# Patient Record
Sex: FEMALE | Race: WHITE | NOT HISPANIC OR LATINO | Employment: OTHER | URBAN - METROPOLITAN AREA
[De-identification: names, ages, dates, MRNs, and addresses within clinical notes are randomized per-mention and may not be internally consistent; named-entity substitution may affect disease eponyms.]

---

## 2017-07-03 ENCOUNTER — TRANSFERRED RECORDS (OUTPATIENT)
Dept: HEALTH INFORMATION MANAGEMENT | Facility: CLINIC | Age: 67
End: 2017-07-03

## 2017-07-24 ENCOUNTER — TRANSFERRED RECORDS (OUTPATIENT)
Dept: HEALTH INFORMATION MANAGEMENT | Facility: CLINIC | Age: 67
End: 2017-07-24

## 2017-07-25 ENCOUNTER — OFFICE VISIT (OUTPATIENT)
Dept: FAMILY MEDICINE | Facility: CLINIC | Age: 67
End: 2017-07-25
Payer: COMMERCIAL

## 2017-07-25 VITALS
HEART RATE: 62 BPM | OXYGEN SATURATION: 99 % | DIASTOLIC BLOOD PRESSURE: 81 MMHG | TEMPERATURE: 97 F | BODY MASS INDEX: 20.54 KG/M2 | RESPIRATION RATE: 16 BRPM | WEIGHT: 104.6 LBS | SYSTOLIC BLOOD PRESSURE: 126 MMHG | HEIGHT: 60 IN

## 2017-07-25 DIAGNOSIS — I45.10 RIGHT BUNDLE BRANCH BLOCK: ICD-10-CM

## 2017-07-25 DIAGNOSIS — Z12.31 VISIT FOR SCREENING MAMMOGRAM: ICD-10-CM

## 2017-07-25 DIAGNOSIS — L82.1 SEBORRHEIC KERATOSIS: ICD-10-CM

## 2017-07-25 DIAGNOSIS — Z00.00 ENCOUNTER FOR ROUTINE ADULT HEALTH EXAMINATION WITHOUT ABNORMAL FINDINGS: Primary | ICD-10-CM

## 2017-07-25 LAB
ALBUMIN SERPL-MCNC: 4 G/DL (ref 3.4–5)
ALP SERPL-CCNC: 83 U/L (ref 40–150)
ALT SERPL W P-5'-P-CCNC: 18 U/L (ref 0–50)
ANION GAP SERPL CALCULATED.3IONS-SCNC: 6 MMOL/L (ref 3–14)
AST SERPL W P-5'-P-CCNC: 97 U/L (ref 0–45)
BILIRUB SERPL-MCNC: 0.7 MG/DL (ref 0.2–1.3)
BUN SERPL-MCNC: 15 MG/DL (ref 7–30)
CALCIUM SERPL-MCNC: 9.4 MG/DL (ref 8.5–10.1)
CHLORIDE SERPL-SCNC: 105 MMOL/L (ref 94–109)
CO2 SERPL-SCNC: 29 MMOL/L (ref 20–32)
CREAT SERPL-MCNC: 0.82 MG/DL (ref 0.52–1.04)
GFR SERPL CREATININE-BSD FRML MDRD: 70 ML/MIN/1.7M2
GLUCOSE SERPL-MCNC: 76 MG/DL (ref 70–99)
POTASSIUM SERPL-SCNC: 4.1 MMOL/L (ref 3.4–5.3)
PROT SERPL-MCNC: 7.6 G/DL (ref 6.8–8.8)
SODIUM SERPL-SCNC: 140 MMOL/L (ref 133–144)

## 2017-07-25 PROCEDURE — 36415 COLL VENOUS BLD VENIPUNCTURE: CPT | Performed by: PHYSICIAN ASSISTANT

## 2017-07-25 PROCEDURE — 99396 PREV VISIT EST AGE 40-64: CPT | Performed by: PHYSICIAN ASSISTANT

## 2017-07-25 PROCEDURE — 80053 COMPREHEN METABOLIC PANEL: CPT | Performed by: PHYSICIAN ASSISTANT

## 2017-07-25 NOTE — PROGRESS NOTES
SUBJECTIVE:   CC: Rosa M Solano is an 66 year old woman who presents for preventive health visit.     Healthy Habits:    Do you get at least three servings of calcium containing foods daily (dairy, green leafy vegetables, etc.)? yes    Amount of exercise or daily activities, outside of work: 6 day(s) per week    Problems taking medications regularly No    Medication side effects: Yes tetracyclines    Have you had an eye exam in the past two years? yes    Do you see a dentist twice per year? yes    Do you have sleep apnea, excessive snoring or daytime drowsiness?no        Additional concerns: None    Today's PHQ-2 Score:   PHQ-2 ( 1999 Pfizer) 7/25/2017 7/20/2016   Q1: Little interest or pleasure in doing things 0 0   Q2: Feeling down, depressed or hopeless 0 0   PHQ-2 Score 0 0         Abuse: Current or Past(Physical, Sexual or Emotional)- No  Do you feel safe in your environment - Yes    Social History   Substance Use Topics     Smoking status: Never Smoker     Smokeless tobacco: Never Used     Alcohol use No     The patient does not drink >3 drinks per day nor >7 drinks per week.    Reviewed orders with patient.  Reviewed health maintenance and updated orders accordingly - Yes  Patient Active Problem List   Diagnosis     CARDIOVASCULAR SCREENING; LDL GOAL LESS THAN 160     Advanced directives, counseling/discussion     Seborrheic keratosis     Dysplastic nevi     Right bundle branch block     Past Surgical History:   Procedure Laterality Date     BIOPSY OF BREAST, INCISIONAL  2003    benign left breast mass     BUNIONECTOMY RT/LT  2007    RT     COLONOSCOPY  2003    NL, repeat in 10 years     COLONOSCOPY  9/13/2013    NL, repeat 10 years     CRYOCAUTERY OF CERVIX  1981     DILATION AND CURETTAGE, HYSTEROSCOPY DIAGNOSTIC, COMBINED N/A 10/16/2015    Procedure: COMBINED DILATION AND CURETTAGE, HYSTEROSCOPY DIAGNOSTIC;  Surgeon: Zahida Salazar MD;  Location: [facility] REPAIR OF MIRNAERTOE,ONE  2007    RT  foot second toe     PUNC/ASPIR BREAST CYST      benign right breast cyst       Social History   Substance Use Topics     Smoking status: Never Smoker     Smokeless tobacco: Never Used     Alcohol use No     Family History   Problem Relation Age of Onset     Alzheimer Disease Mother      Cancer - colorectal Father      at age 60     Circulatory Maternal Grandmother       of hepatitis at age 65     HEART DISEASE Maternal Grandfather      MI age 50         Current Outpatient Prescriptions   Medication Sig Dispense Refill     estradiol (ESTRACE VAGINAL) 0.1 MG/GM vaginal cream PLACE 2 G VAGINALLY TWICE A WEEK 127.5 g 3     CALCIUM 600 + D 600-400 MG-UNIT OR TABS take BID 0 0     MULTIVITAMIN TABS   OR one tab daily  0     Allergies   Allergen Reactions     Tetracyclines      Recent Labs   Lab Test  16   0836  16   1239  08/03/15   1622  07/17/15   0807  07/10/14   0758  13   0712   LDL  104*   --    --   92  108  92   HDL  68   --    --   73  68  68   TRIG  65   --    --   79  88  69   ALT   --    --    --   17  12  16   CR   --   0.71   --   0.64  0.75  0.71   GFRESTIMATED   --   83   --   >90  Non  GFR Calc    79  84   GFRESTBLACK   --   >90   GFR Calc     --   >90   GFR Calc    >90  >90   POTASSIUM   --   4.2   --   4.3  4.0  4.3   TSH   --    --   1.07   --   1.26   --             Patient over age 50, mutual decision to screen reflected in health maintenance.      Pertinent mammograms are reviewed under the imaging tab.  History of abnormal Pap smear: NO - age 30-65 PAP every 5 years with negative HPV co-testing recommended    Reviewed and updated as needed this visit by clinical staff  Tobacco  Allergies  Meds  Surg Hx  Fam Hx         Reviewed and updated as needed this visit by Provider  Surg Hx  Fam Hx        Past Medical History:   Diagnosis Date     Bundle branch block right      Diffuse cystic mastopathy      Dysplastic nevi   "    Infectious mononucleosis 1964     Leiomyoma of uterus, unspecified 2004    3cm fibroid per ultrasound     Lump or mass in breast 2004    aspiration large right breast cyst     Periodontitis      Seborrheic keratosis      Unexplained endometrial cells on cervical Pap smear 7/2015    postmenopausal      Past Surgical History:   Procedure Laterality Date     BIOPSY OF BREAST, INCISIONAL  2003    benign left breast mass     BUNIONECTOMY RT/LT  2007    RT     COLONOSCOPY  2003    NL, repeat in 10 years     COLONOSCOPY  9/13/2013    NL, repeat 10 years     CRYOCAUTERY OF CERVIX  1981     DILATION AND CURETTAGE, HYSTEROSCOPY DIAGNOSTIC, COMBINED N/A 10/16/2015    Procedure: COMBINED DILATION AND CURETTAGE, HYSTEROSCOPY DIAGNOSTIC;  Surgeon: Zahida Salazar MD;  Location: [facility] REPAIR OF HAMMERTOE,ONE  2007    RT foot second toe     PUNC/ASPIR BREAST CYST  2004    benign right breast cyst     Obstetric History     No data available          ROS:  C: NEGATIVE for fever, chills, change in weight  I: NEGATIVE for worrisome rashes, moles or lesions  E: NEGATIVE for vision changes or irritation  ENT: NEGATIVE for ear, mouth and throat problems  R: NEGATIVE for significant cough or SOB  B: NEGATIVE for masses, tenderness or discharge  CV: NEGATIVE for chest pain, palpitations or peripheral edema  GI: NEGATIVE for nausea, abdominal pain, heartburn, or change in bowel habits  : NEGATIVE for unusual urinary or vaginal symptoms. Periods are regular.  M: NEGATIVE for significant arthralgias or myalgia  N: NEGATIVE for weakness, dizziness or paresthesias  P: NEGATIVE for changes in mood or affect    OBJECTIVE:   /81 (BP Location: Left arm, Patient Position: Chair, Cuff Size: Adult Regular)  Pulse 62  Temp 97  F (36.1  C) (Tympanic)  Resp 16  Ht 5' 0.43\" (1.535 m)  Wt 104 lb 9.6 oz (47.4 kg)  LMP 05/16/2012  SpO2 99%  BMI 20.14 kg/m2  EXAM:  GENERAL: healthy, alert and no distress  EYES: Eyes grossly " "normal to inspection, PERRL and conjunctivae and sclerae normal  HENT: ear canals and TM's normal, nose and mouth without ulcers or lesions  NECK: no adenopathy, no asymmetry, masses, or scars and thyroid normal to palpation  RESP: lungs clear to auscultation - no rales, rhonchi or wheezes  BREAST: normal without masses, tenderness or nipple discharge and no palpable axillary masses or adenopathy  CV: regular rate and rhythm, normal S1 S2, no S3 or S4, no murmur, click or rub, no peripheral edema  ABDOMEN: soft, nontender, no hepatosplenomegaly, no masses and bowel sounds normal  MS: no gross musculoskeletal defects noted, no edema  SKIN: numerous scattered seborrheic keratosis along chest and back  NEURO: Normal strength and tone, mentation intact and speech normal  PSYCH: mentation appears normal, affect normal/bright    ASSESSMENT/PLAN:   1. Encounter for routine adult health examination without abnormal findings  - Comprehensive metabolic panel (BMP + Alb, Alk Phos, ALT, AST, Total. Bili, TP)    2. Visit for screening mammogram  Due In october    3. Right bundle branch block  Noted on previous EKG, asymptomatic    4. Seborrheic keratosis  Following with derm    COUNSELING:   Reviewed preventive health counseling, as reflected in patient instructions       Regular exercise       Healthy diet/nutrition         reports that she has never smoked. She has never used smokeless tobacco.    Estimated body mass index is 20.14 kg/(m^2) as calculated from the following:    Height as of this encounter: 5' 0.43\" (1.535 m).    Weight as of this encounter: 104 lb 9.6 oz (47.4 kg).         Counseling Resources:  ATP IV Guidelines  Pooled Cohorts Equation Calculator  Breast Cancer Risk Calculator  FRAX Risk Assessment  ICSI Preventive Guidelines  Dietary Guidelines for Americans, 2010  USDA's MyPlate  ASA Prophylaxis  Lung CA Screening    Jeovany Kinney PA-C  [facility]  "

## 2017-07-25 NOTE — NURSING NOTE
"Chief Complaint   Patient presents with     Physical       Initial /81 (BP Location: Left arm, Patient Position: Chair, Cuff Size: Adult Regular)  Pulse 62  Temp 97  F (36.1  C) (Tympanic)  Resp 16  Ht 5' 0.43\" (1.535 m)  Wt 104 lb 9.6 oz (47.4 kg)  LMP 05/16/2012  SpO2 99%  BMI 20.14 kg/m2 Estimated body mass index is 20.14 kg/(m^2) as calculated from the following:    Height as of this encounter: 5' 0.43\" (1.535 m).    Weight as of this encounter: 104 lb 9.6 oz (47.4 kg).  Medication Reconciliation: complete    Emely Hurt MA  "

## 2017-07-25 NOTE — MR AVS SNAPSHOT
After Visit Summary   7/25/2017    Rosa M Solano    MRN: [id number]           Patient Information     Date Of Birth          1950        Visit Information        Provider Department      7/25/2017 7:00 AM Jeovany Kinney PA-C [facility]        Today's Diagnoses     Encounter for routine adult health examination without abnormal findings    -  1    Visit for screening mammogram        Right bundle branch block          Care Instructions      Preventive Health Recommendations  Female Ages 50 - 64    Yearly exam: See your health care provider every year in order to  o Review health changes.   o Discuss preventive care.    o Review your medicines if your doctor has prescribed any.      Get a Pap test every three years (unless you have an abnormal result and your provider advises testing more often).    If you get Pap tests with HPV test, you only need to test every 5 years, unless you have an abnormal result.     You do not need a Pap test if your uterus was removed (hysterectomy) and you have not had cancer.    You should be tested each year for STDs (sexually transmitted diseases) if you're at risk.     Have a mammogram every 1 to 2 years.    Have a colonoscopy at age 50, or have a yearly FIT test (stool test). These exams screen for colon cancer.      Have a cholesterol test every 5 years, or more often if advised.    Have a diabetes test (fasting glucose) every three years. If you are at risk for diabetes, you should have this test more often.     If you are at risk for osteoporosis (brittle bone disease), think about having a bone density scan (DEXA).    Shots: Get a flu shot each year. Get a tetanus shot every 10 years.    Nutrition:     Eat at least 5 servings of fruits and vegetables each day.    Eat whole-grain bread, whole-wheat pasta and brown rice instead of white grains and rice.    Talk to your provider about Calcium and Vitamin D.     Lifestyle    Exercise  "at least 150 minutes a week (30 minutes a day, 5 days a week). This will help you control your weight and prevent disease.    Limit alcohol to one drink per day.    No smoking.     Wear sunscreen to prevent skin cancer.     See your dentist every six months for an exam and cleaning.    See your eye doctor every 1 to 2 years.            Follow-ups after your visit        Who to contact     If you have questions or need follow up information about today's clinic visit or your schedule please contact [facility] [place] directly at [phone].  Normal or non-critical lab and imaging results will be communicated to you by Zoomdatahart, letter or phone within 4 business days after the clinic has received the results. If you do not hear from us within 7 days, please contact the clinic through Audiamt or phone. If you have a critical or abnormal lab result, we will notify you by phone as soon as possible.  Submit refill requests through [employer] or call your pharmacy and they will forward the refill request to us. Please allow 3 business days for your refill to be completed.          Additional Information About Your Visit        Zoomdatahart Information     [employer] gives you secure access to your electronic health record. If you see a primary care provider, you can also send messages to your care team and make appointments. If you have questions, please call your primary care clinic.  If you do not have a primary care provider, please call [phone] and they will assist you.        Care EveryWhere ID     This is your Care EveryWhere ID. This could be used by other organizations to access your [facility] medical records  [id number]        Your Vitals Were     Pulse Temperature Respirations Height Last Period Pulse Oximetry    62 97  F (36.1  C) (Tympanic) 16 5' 0.43\" (1.535 m) 05/16/2012 99%    BMI (Body Mass Index)                   20.14 kg/m2            Blood Pressure from Last 3 Encounters:   07/25/17 126/81 "   07/20/16 121/75   01/26/16 126/72    Weight from Last 3 Encounters:   07/25/17 104 lb 9.6 oz (47.4 kg)   07/20/16 108 lb (49 kg)   01/26/16 110 lb (49.9 kg)              We Performed the Following     Comprehensive metabolic panel (BMP + Alb, Alk Phos, ALT, AST, Total. Bili, TP)        Primary Care Provider Office Phone # Fax #    NEHEMIAS Garcia [phone]       [address]  [place] [address]        Equal Access to Services     [facility]: Hadii aad ku hadasho Soabelardoali, waaxda luqadaha, qaybta kaalmada adeegyada, sydnie rodriguez . So [facility] [phone].    ATENCIÓN: Si habla español, tiene a basurto disposición servicios gratuitos de asistencia lingüística. Llame al [phone].    We comply with applicable federal civil rights laws and [place] laws. We do not discriminate on the basis of race, color, national origin, age, disability sex, sexual orientation or gender identity.            Thank you!     Thank you for choosing [facility] [place]  for your care. Our goal is always to provide you with excellent care. Hearing back from our patients is one way we can continue to improve our services. Please take a few minutes to complete the written survey that you may receive in the mail after your visit with us. Thank you!             Your Updated Medication List - Protect others around you: Learn how to safely use, store and throw away your medicines at www.disposemymeds.org.          This list is accurate as of: 7/25/17  7:32 AM.  Always use your most recent med list.                   Brand Name Dispense Instructions for use Diagnosis    calcium 600 + D 600-400 MG-UNIT per tablet   Generic drug:  calcium-vitamin D     0    take BID        estradiol 0.1 MG/GM cream    ESTRACE VAGINAL    127.5 g    PLACE 2 G VAGINALLY TWICE A WEEK    Vaginal atrophy       MULTIVITAMIN TABS   OR      one tab daily    Routine general medical  examination at a health care facility

## 2017-07-26 NOTE — PROGRESS NOTES
Rosa M-  Here are your recent results.       -Liver and gallbladder tests (ALT, Alk phos,bilirubin) are  Overall normal.  Your AST remains elevated ( 97) but is stable from previous readings, we will continue to monitor this  -Kidney function (GFR) is normal.  -Sodium is normal.  -Potassium is normal.  -Glucose (diabetic screening test) is normal.    If you have any questions please do not hesitate to contact our office via phone ([phone]) or you may send me a message via SmartCup by clicking the contact my Care Team link.      It was a pleasure  participating in your care!    Thank you,    Jeovany Kinney MPH, PA-C  [address] [place] [address] [phone]

## 2017-10-16 ENCOUNTER — HOSPITAL ENCOUNTER (OUTPATIENT)
Dept: MAMMOGRAPHY | Facility: CLINIC | Age: 67
Discharge: HOME | End: 2017-10-16
Attending: PHYSICIAN ASSISTANT | Admitting: PHYSICIAN ASSISTANT
Payer: COMMERCIAL

## 2017-10-16 DIAGNOSIS — Z12.39 SCREENING BREAST EXAMINATION: ICD-10-CM

## 2017-10-16 PROCEDURE — G0202 SCR MAMMO BI INCL CAD: HCPCS

## 2017-11-02 ENCOUNTER — OFFICE VISIT (OUTPATIENT)
Dept: FAMILY MEDICINE | Facility: CLINIC | Age: 67
End: 2017-11-02
Payer: COMMERCIAL

## 2017-11-02 VITALS
SYSTOLIC BLOOD PRESSURE: 130 MMHG | HEART RATE: 70 BPM | RESPIRATION RATE: 20 BRPM | BODY MASS INDEX: 20.93 KG/M2 | HEIGHT: 60 IN | OXYGEN SATURATION: 98 % | WEIGHT: 106.6 LBS | DIASTOLIC BLOOD PRESSURE: 70 MMHG | TEMPERATURE: 98.3 F

## 2017-11-02 DIAGNOSIS — L03.90 CELLULITIS, UNSPECIFIED CELLULITIS SITE: Primary | ICD-10-CM

## 2017-11-02 PROCEDURE — 99213 OFFICE O/P EST LOW 20 MIN: CPT | Performed by: PHYSICIAN ASSISTANT

## 2017-11-02 RX ORDER — CEPHALEXIN 500 MG/1
500 CAPSULE ORAL 4 TIMES DAILY
Qty: 28 CAPSULE | Refills: 0 | Status: SHIPPED | OUTPATIENT
Start: 2017-11-02 | End: 2017-11-09

## 2017-11-02 NOTE — MR AVS SNAPSHOT
"              After Visit Summary   11/2/2017    Rosa M Solano    MRN: [id number]           Patient Information     Date Of Birth          1950        Visit Information        Provider Department      11/2/2017 10:00 AM Jeovany Kinney PA-C [facility] [place]        Today's Diagnoses     Cellulitis, unspecified cellulitis site    -  1       Follow-ups after your visit        Who to contact     If you have questions or need follow up information about today's clinic visit or your schedule please contact [facility] directly at [phone].  Normal or non-critical lab and imaging results will be communicated to you by Around Knowledgehart, letter or phone within 4 business days after the clinic has received the results. If you do not hear from us within 7 days, please contact the clinic through Around Knowledgehart or phone. If you have a critical or abnormal lab result, we will notify you by phone as soon as possible.  Submit refill requests through MovingWorlds or call your pharmacy and they will forward the refill request to us. Please allow 3 business days for your refill to be completed.          Additional Information About Your Visit        MyChart Information     MovingWorlds gives you secure access to your electronic health record. If you see a primary care provider, you can also send messages to your care team and make appointments. If you have questions, please call your primary care clinic.  If you do not have a primary care provider, please call [phone] and they will assist you.        Care EveryWhere ID     This is your Care EveryWhere ID. This could be used by other organizations to access your [facility] medical records  [id number]        Your Vitals Were     Pulse Temperature Respirations Height Last Period Pulse Oximetry    70 98.3  F (36.8  C) (Tympanic) 20 5' 0.43\" (1.535 m) 05/16/2012 98%    BMI (Body Mass Index)                   20.52 kg/m2            Blood Pressure from Last 3 " Encounters:   11/02/17 155/90   07/25/17 126/81   07/20/16 121/75    Weight from Last 3 Encounters:   11/02/17 106 lb 9.6 oz (48.4 kg)   07/25/17 104 lb 9.6 oz (47.4 kg)   07/20/16 108 lb (49 kg)              Today, you had the following     No orders found for display         Today's Medication Changes          These changes are accurate as of: 11/2/17 10:25 AM.  If you have any questions, ask your nurse or doctor.               Start taking these medicines.        Dose/Directions    cephALEXin 500 MG capsule   Commonly known as:  KEFLEX   Used for:  Cellulitis, unspecified cellulitis site   Started by:  Jeovany Kinney PA-C        Dose:  500 mg   Take 1 capsule (500 mg) by mouth 4 times daily for 7 days   Quantity:  28 capsule   Refills:  0            Where to get your medicines      These medications were sent to [facility]pharm[id number] [place] [facility] [place] - [address] [address]     Phone:  [phone]     cephALEXin 500 MG capsule                Primary Care Provider Office Phone # Fax #    Jeovany Kinney PA-C [phone]       [address]  [place] [address]        Equal Access to Services     [facility]: Hadii bahman avelar hadasho Soabe, waaxda luqadaha, qaybta kaalmada adeegyada, sydnie rodriguez . So [facility] [phone].    ATENCIÓN: Si habla español, tiene a basurto disposición servicios gratuitos de asistencia lingüística. Llame al [phone].    We comply with applicable federal civil rights laws and [place] laws. We do not discriminate on the basis of race, color, national origin, age, disability, sex, sexual orientation, or gender identity.            Thank you!     Thank you for choosing [facility] [place]  for your care. Our goal is always to provide you with excellent care. Hearing back from our patients is one way we can continue to improve our services. Please take a few minutes to  complete the written survey that you may receive in the mail after your visit with us. Thank you!             Your Updated Medication List - Protect others around you: Learn how to safely use, store and throw away your medicines at www.disposemymeds.org.          This list is accurate as of: 11/2/17 10:25 AM.  Always use your most recent med list.                   Brand Name Dispense Instructions for use Diagnosis    calcium 600 + D 600-400 MG-UNIT per tablet   Generic drug:  calcium-vitamin D     0    take BID        cephALEXin 500 MG capsule    KEFLEX    28 capsule    Take 1 capsule (500 mg) by mouth 4 times daily for 7 days    Cellulitis, unspecified cellulitis site       estradiol 0.1 MG/GM cream    ESTRACE VAGINAL    127.5 g    PLACE 2 G VAGINALLY TWICE A WEEK    Vaginal atrophy       MULTIVITAMIN TABS   OR      one tab daily    Routine general medical examination at a health care facility

## 2017-11-02 NOTE — NURSING NOTE
"Chief Complaint   Patient presents with     Swelling     rt foot       Initial /90 (BP Location: Left arm, Patient Position: Chair, Cuff Size: Adult Regular)  Pulse 70  Temp 98.3  F (36.8  C) (Tympanic)  Resp 20  Ht 5' 0.43\" (1.535 m)  Wt 106 lb 9.6 oz (48.4 kg)  LMP 05/16/2012  SpO2 98%  BMI 20.52 kg/m2 Estimated body mass index is 20.52 kg/(m^2) as calculated from the following:    Height as of this encounter: 5' 0.43\" (1.535 m).    Weight as of this encounter: 106 lb 9.6 oz (48.4 kg).  Medication Reconciliation: complete    Emely Hurt MA  "

## 2017-11-02 NOTE — PROGRESS NOTES
SUBJECTIVE:   Rosa M Solano is a 66 year old female who presents to clinic today for the following health issues:    Foot Swelling    Onset:  2 days Pt has been keeping her rt leg elevated.    Description:   Location: Right Foot/Ankle  Character: Sharp pain  and redness, some swelling    Intensity: moderate    Progression of Symptoms: better, intermittent    Accompanying Signs & Symptoms:  Other symptoms: none    History:   Previous similar pain: no       Precipitating factors:   Trauma or overuse:Sunday has a walk with  for about an hour. Pt does walk daily with .    Alleviating factors:  Improved by: rest/inactivity, Ibuprofen and elevation of rt leg    Therapies Tried and outcome: Ibuprofen, ice, elevation, rest.      Rosa M noted some mild swelling to her medial right ankle after 2 days ago.  She noted the redness and swelling in the morning and it was made worse by a long walk later that day.  After the walk, she notes that the area became more swollen and painful.  Pain is worse with weight bearing and with rotation of the ankle.  No known injury, no recent foot surgery or immobility, no recent travel or hx of DVT.         Problem list and histories reviewed & adjusted, as indicated.  Additional history: as documented    Patient Active Problem List   Diagnosis     CARDIOVASCULAR SCREENING; LDL GOAL LESS THAN 160     Advanced directives, counseling/discussion     Seborrheic keratosis     Dysplastic nevi     Right bundle branch block     Past Surgical History:   Procedure Laterality Date     BIOPSY OF BREAST, INCISIONAL  2003    benign left breast mass     BUNIONECTOMY RT/LT  2007    RT     COLONOSCOPY  2003    NL, repeat in 10 years     COLONOSCOPY  9/13/2013    NL, repeat 10 years     CRYOCAUTERY OF CERVIX  1981     DILATION AND CURETTAGE, HYSTEROSCOPY DIAGNOSTIC, COMBINED N/A 10/16/2015    Procedure: COMBINED DILATION AND CURETTAGE, HYSTEROSCOPY DIAGNOSTIC;  Surgeon: Zahida Salazar  "MD;  [facility]      RT foot second toe     PUNC/ASPIR BREAST CYST      benign right breast cyst       Social History   Substance Use Topics     Smoking status: Never Smoker     Smokeless tobacco: Never Used     Alcohol use No     Family History   Problem Relation Age of Onset     Alzheimer Disease Mother      Cancer - colorectal Father      at age 60     Circulatory Maternal Grandmother       of hepatitis at age 65     HEART DISEASE Maternal Grandfather      MI age 50         Current Outpatient Prescriptions   Medication Sig Dispense Refill     cephALEXin (KEFLEX) 500 MG capsule Take 1 capsule (500 mg) by mouth 4 times daily for 7 days 28 capsule 0     estradiol (ESTRACE VAGINAL) 0.1 MG/GM vaginal cream PLACE 2 G VAGINALLY TWICE A WEEK 127.5 g 3     CALCIUM 600 + D 600-400 MG-UNIT OR TABS take BID 0 0     MULTIVITAMIN TABS   OR one tab daily  0     Allergies   Allergen Reactions     Tetracyclines          Reviewed and updated as needed this visit by clinical staff       Reviewed and updated as needed this visit by Provider         ROS:  Constitutional, HEENT, cardiovascular, pulmonary, gi and gu systems are negative, except as otherwise noted.      OBJECTIVE:   /70  Pulse 70  Temp 98.3  F (36.8  C) (Tympanic)  Resp 20  Ht 5' 0.43\" (1.535 m)  Wt 106 lb 9.6 oz (48.4 kg)  LMP 2012  SpO2 98%  BMI 20.52 kg/m2  Body mass index is 20.52 kg/(m^2).  GENERAL: healthy, alert and no distress  MS: no gross musculoskeletal defects noted, no edema, FROM of right ankle, no laurel TTP  SKIN:  3x3 area os focal erythema, warmth and edema noted to right medial ankle that is TTP  NEURO: Normal strength and tone, mentation intact and speech normal, full DP and PT pulses bilaterally  PSYCH: mentation appears normal, affect normal/bright    Diagnostic Test Results:  none     ASSESSMENT/PLAN:       1. Cellulitis, unspecified cellulitis site  Etiology of swelling likely to " be arthritic flare/effusion of right ankle vs.  Cellulitis.  She has good ROM of right ankle and I do not suspect deep infection.  Advise ice and elevation, NSAIDs and keflex at this time.  If no improvement in 3-4 days, will consider US  - cephALEXin (KEFLEX) 500 MG capsule; Take 1 capsule (500 mg) by mouth 4 times daily for 7 days  Dispense: 28 capsule; Refill: 0    See Patient Instructions    Jeovany Kinney PA-C  [facility]

## 2017-12-05 DIAGNOSIS — N95.2 VAGINAL ATROPHY: ICD-10-CM

## 2017-12-05 NOTE — TELEPHONE ENCOUNTER
Reason for Call:  Medication or medication refill:    Do you use a [facility]?  Name of the pharmacy and phone number for the current request:  [facility] [place] - [phone]    Name of the medication requested: estradiol (ESTRACE VAGINAL) 0.1 MG/GM vaginal cream    Other request: Pt would like to take 1 gram 2 times a week, can we change the rx please per pt    Can we leave a detailed message on this number? YES    Phone number patient can be reached at: Home number on file [phone] (home)    Best Time:     Call taken on 12/5/2017 at 1:11 PM by Gavi Jade

## 2017-12-07 RX ORDER — ESTRADIOL 0.1 MG/G
CREAM VAGINAL
Qty: 127.5 G | Refills: 3 | Status: SHIPPED | OUTPATIENT
Start: 2017-12-07 | End: 2018-11-05

## 2018-07-23 ASSESSMENT — ACTIVITIES OF DAILY LIVING (ADL)
CURRENT_FUNCTION: NO ASSISTANCE NEEDED
I_NEED_ASSISTANCE_FOR_THE_FOLLOWING_DAILY_ACTIVITIES:: NO ASSISTANCE IS NEEDED

## 2018-07-26 ENCOUNTER — OFFICE VISIT (OUTPATIENT)
Dept: FAMILY MEDICINE | Facility: CLINIC | Age: 68
End: 2018-07-26
Payer: COMMERCIAL

## 2018-07-26 VITALS
HEART RATE: 61 BPM | OXYGEN SATURATION: 100 % | BODY MASS INDEX: 19.24 KG/M2 | HEIGHT: 60 IN | WEIGHT: 98 LBS | SYSTOLIC BLOOD PRESSURE: 158 MMHG | TEMPERATURE: 97 F | DIASTOLIC BLOOD PRESSURE: 74 MMHG

## 2018-07-26 DIAGNOSIS — Z00.00 ROUTINE ADULT HEALTH MAINTENANCE: Primary | ICD-10-CM

## 2018-07-26 DIAGNOSIS — R03.0 ELEVATED BLOOD PRESSURE READING WITHOUT DIAGNOSIS OF HYPERTENSION: ICD-10-CM

## 2018-07-26 DIAGNOSIS — Z23 NEED FOR PROPHYLACTIC VACCINATION AGAINST STREPTOCOCCUS PNEUMONIAE (PNEUMOCOCCUS): ICD-10-CM

## 2018-07-26 PROCEDURE — 90670 PCV13 VACCINE IM: CPT | Performed by: INTERNAL MEDICINE

## 2018-07-26 PROCEDURE — G0009 ADMIN PNEUMOCOCCAL VACCINE: HCPCS | Performed by: INTERNAL MEDICINE

## 2018-07-26 PROCEDURE — 99397 PER PM REEVAL EST PAT 65+ YR: CPT | Mod: 25 | Performed by: INTERNAL MEDICINE

## 2018-07-26 ASSESSMENT — ACTIVITIES OF DAILY LIVING (ADL): CURRENT_FUNCTION: NO ASSISTANCE NEEDED

## 2018-07-26 NOTE — PATIENT INSTRUCTIONS
Blood pressure - get a cuff to check at home or check a few times at the store.  [place] is <120/80.  We should consider treating if it is > 140/90.

## 2018-07-26 NOTE — PROGRESS NOTES
SUBJECTIVE:   CC: Rosa M Solano is an 67 year old woman who presents for preventive health visit.     Rosa M lives with her .  She has a grown son and two grandchildren who live in [place].  Retired last fall.  She has been spending a lot of time outside and gardening and reading.       Physical   Annual:     Getting at least 3 servings of Calcium per day:  Yes    Bi-annual eye exam:  Yes    Dental care twice a year:  Yes    Sleep apnea or symptoms of sleep apnea:  None    Diet:  Regular (no restrictions)    Frequency of exercise:  6-7 days/week    Duration of exercise:  45-60 minutes    Taking medications regularly:  Yes    Medication side effects:  None    Additional concerns today:  No    Ability to successfully perform activities of daily living: no assistance needed    Home Safety:  No safety concerns identified    Hearing Impairment: no hearing concerns         Today's PHQ-2 Score:   PHQ-2 ( 1999 [employer]) 7/23/2018   Q1: Little interest or pleasure in doing things 0   Q2: Feeling down, depressed or hopeless 0   PHQ-2 Score 0   Q1: Little interest or pleasure in doing things Not at all   Q2: Feeling down, depressed or hopeless Not at all   PHQ-2 Score 0   Answers for HPI/ROS submitted by the patient on 7/23/2018   PHQ-2 Score: 0      Abuse: Current or Past(Physical, Sexual or Emotional)- No  Do you feel safe in your environment - Yes    Social History   Substance Use Topics     Smoking status: Never Smoker     Smokeless tobacco: Never Used     Alcohol use No     Alcohol Use 7/23/2018   If you drink alcohol do you typically have greater than 3 drinks per day OR greater than 7 drinks per week? Not Applicable   No flowsheet data found.    Reviewed orders with patient.  Reviewed health maintenance and updated orders accordingly - Yes  Patient Active Problem List   Diagnosis     CARDIOVASCULAR SCREENING; LDL GOAL LESS THAN 160     Advanced directives, counseling/discussion     Seborrheic keratosis      Dysplastic nevi     Right bundle branch block     Past Surgical History:   Procedure Laterality Date     BIOPSY OF BREAST, INCISIONAL      benign left breast mass     BUNIONECTOMY RT/LT  2007    RT     COLONOSCOPY      NL, repeat in 10 years     COLONOSCOPY  2013    NL, repeat 10 years     CRYOCAUTERY OF CERVIX  1981     DILATION AND CURETTAGE, HYSTEROSCOPY DIAGNOSTIC, COMBINED N/A 10/16/2015    Procedure: COMBINED DILATION AND CURETTAGE, HYSTEROSCOPY DIAGNOSTIC;  Surgeon: Zahida Salazar MD;  Location: [facility] REPAIR OF HAMMERTOE,ONE      RT foot second toe     PUNC/ASPIR BREAST CYST      benign right breast cyst       Social History   Substance Use Topics     Smoking status: Never Smoker     Smokeless tobacco: Never Used     Alcohol use No     Family History   Problem Relation Age of Onset     Alzheimer Disease Mother      Cancer - colorectal Father      at age 60     Bladder Cancer Father      90s     Circulatory Maternal Grandmother       of hepatitis at age 65     HEART DISEASE Maternal Grandfather      MI age 50         Current Outpatient Prescriptions   Medication Sig Dispense Refill     CALCIUM 600 + D 600-400 MG-UNIT OR TABS take BID 0 0     estradiol (ESTRACE VAGINAL) 0.1 MG/GM cream PLACE 2 G VAGINALLY TWICE A WEEK (Patient taking differently: Place 1 g vaginally twice a week PLACE 2 G VAGINALLY TWICE A WEEK) 127.5 g 3     MULTIVITAMIN TABS   OR one tab daily  0       Patient over age 50, mutual decision to screen reflected in health maintenance.    Pertinent mammograms are reviewed under the imaging tab.  History of abnormal Pap smear: NO - age 67 - see link Cervical Cytology Screening Guidelines  PAP / HPV Latest Ref Rng & Units 2016   PAP - NIL OTHER-NIL, See Result NIL   HPV 16 DNA NEG - Negative -   HPV 18 DNA NEG - Negative -   OTHER HR HPV NEG - Negative -     Reviewed and updated as needed this visit by clinical staff  Tobacco  Allergies  " Meds  Med Hx  Surg Hx  Fam Hx  Soc Hx        Reviewed and updated as needed this visit by Provider  Tobacco  Meds  Med Hx  Surg Hx  Fam Hx  Soc Hx           Review of Systems  CONSTITUTIONAL: NEGATIVE for fever, chills, change in weight  INTEGUMENTARY/SKIN: NEGATIVE for worrisome rashes, moles or lesions  EYES: NEGATIVE for vision changes or irritation  ENT: NEGATIVE for ear, mouth and throat problems  RESP: NEGATIVE for significant cough or SOB  BREAST: NEGATIVE for masses, tenderness or discharge  CV: NEGATIVE for chest pain, palpitations or peripheral edema  GI: NEGATIVE for nausea, abdominal pain, heartburn, or change in bowel habits  : NEGATIVE for unusual urinary or vaginal symptoms. No vaginal bleeding.  MUSCULOSKELETAL: NEGATIVE for significant arthralgias or myalgia  NEURO: NEGATIVE for weakness, dizziness or paresthesias  PSYCHIATRIC: NEGATIVE for changes in mood or affect      OBJECTIVE:   /74  Pulse 61  Temp 97  F (36.1  C) (Tympanic)  Ht 5' 0.43\" (1.535 m)  Wt 98 lb (44.5 kg)  LMP 05/16/2012  SpO2 100%  BMI 18.87 kg/m2  Physical Exam  GENERAL: healthy, alert and no distress  EYES: Eyes grossly normal to inspection, PERRL and conjunctivae and sclerae normal  HENT: ear canals and TM's normal, mouth without ulcers or lesions  NECK: no adenopathy, no asymmetry, masses, or scars and thyroid normal to palpation  RESP: lungs clear to auscultation - no rales, rhonchi or wheezes  CV: regular rate and rhythm, normal S1 S2, no S3 or S4, no murmur, click or rub, no peripheral edema and peripheral pulses strong  ABDOMEN: soft, nontender, no hepatosplenomegaly, no masses and bowel sounds normal  MS: no gross musculoskeletal defects noted, no edema  SKIN: no suspicious lesions or rashes  NEURO: Normal strength and tone, mentation intact and speech normal  PSYCH: mentation appears normal, affect normal/bright        ASSESSMENT/PLAN:   1. Routine adult health maintenance  Healthy 67 year old " "woman  Mammograms annually in the fall   DEXA scan done 2014 showed osteopenia   Colonoscopy done 2013 - normal   Labs done recently, deferred today     2. Elevated blood pressure reading without diagnosis of hypertension  Recommended checking at home or at the store, send me the readings in a month or so via Vuze.     3. Need for prophylactic vaccination against Streptococcus pneumoniae (pneumococcus)  - Pneumococcal vaccine 13 valent PCV13 IM (Prevnar) [33195]    COUNSELING:  Reviewed preventive health counseling, as reflected in patient instructions  Special attention given to:        Regular exercise       Healthy diet/nutrition       Osteoporosis Prevention/Bone Health    BP Readings from Last 1 Encounters:   07/26/18 158/74     Estimated body mass index is 18.87 kg/(m^2) as calculated from the following:    Height as of this encounter: 5' 0.43\" (1.535 m).    Weight as of this encounter: 98 lb (44.5 kg).           reports that she has never smoked. She has never used smokeless tobacco.      Counseling Resources:  ATP IV Guidelines  Pooled Cohorts Equation Calculator  Breast Cancer Risk Calculator  FRAX Risk Assessment  ICSI Preventive Guidelines  Dietary Guidelines for Americans, 2010  USDA's MyPlate  ASA Prophylaxis  Lung CA Screening    Fabiola Nayak MD  [facility] [place]  "

## 2018-07-26 NOTE — MR AVS SNAPSHOT
After Visit Summary   7/26/2018    Rosa M Solano    MRN: [id number]           Patient Information     Date Of Birth          1950        Visit Information        Provider Department      7/26/2018 10:00 AM Fabiola Nayak MD [facility] [place]        Today's Diagnoses     Screening for HIV (human immunodeficiency virus)        Need for prophylactic vaccination against Streptococcus pneumoniae (pneumococcus)          Care Instructions    Blood pressure - get a cuff to check at home or check a few times at the store.  [place] is <120/80.  We should consider treating if it is > 140/90.          Follow-ups after your visit        Who to contact     If you have questions or need follow up information about today's clinic visit or your schedule please contact [facility] [place] directly at [phone].  Normal or non-critical lab and imaging results will be communicated to you by Star[employer]hart, letter or phone within 4 business days after the clinic has received the results. If you do not hear from us within 7 days, please contact the clinic through [employer] or phone. If you have a critical or abnormal lab result, we will notify you by phone as soon as possible.  Submit refill requests through Ringadoc or call your pharmacy and they will forward the refill request to us. Please allow 3 business days for your refill to be completed.          Additional Information About Your Visit        MyChart Information     [employer] gives you secure access to your electronic health record. If you see a primary care provider, you can also send messages to your care team and make appointments. If you have questions, please call your primary care clinic.  If you do not have a primary care provider, please call [phone] and they will assist you.        Care EveryWhere ID     This is your Care EveryWhere ID. This could be used by other organizations to access your [facility]  "records  [id number]        Your Vitals Were     Pulse Temperature Height Last Period Pulse Oximetry BMI (Body Mass Index)    61 97  F (36.1  C) (Tympanic) 5' 0.43\" (1.535 m) 05/16/2012 100% 18.87 kg/m2       Blood Pressure from Last 3 Encounters:   07/26/18 (!) 157/91   11/02/17 130/70   07/25/17 126/81    Weight from Last 3 Encounters:   07/26/18 98 lb (44.5 kg)   11/02/17 106 lb 9.6 oz (48.4 kg)   07/25/17 104 lb 9.6 oz (47.4 kg)              Today, you had the following     No orders found for display         Today's Medication Changes          These changes are accurate as of 7/26/18 10:30 AM.  If you have any questions, ask your nurse or doctor.               These medicines have changed or have updated prescriptions.        Dose/Directions    estradiol 0.1 MG/GM cream   Commonly known as:  ESTRACE VAGINAL   This may have changed:    - how much to take  - how to take this  - when to take this  - additional instructions   Used for:  Vaginal atrophy        PLACE 2 G VAGINALLY TWICE A WEEK   Quantity:  127.5 g   Refills:  3                Primary Care Provider Office Phone # Fax #    Jeovany Kinney PA-C [phone]       [address]  [place] [address]        Equal Access to Services     FELICIA BROOKS AH: Hadchristine Scales, waaxda luqadaha, qaybta kaaldelvin salinas, sydnie rodriguez . So [facility] [phone].    ATENCIÓN: Si habla español, tiene a basurto disposición servicios gratuitos de asistencia lingüística. Llann al [phone].    We comply with applicable federal civil rights laws and [place] laws. We do not discriminate on the basis of race, color, national origin, age, disability, sex, sexual orientation, or gender identity.            Thank you!     Thank you for choosing [facility] [place]  for your care. Our goal is always to provide you with excellent care. Hearing back from our patients is one way we can continue to improve " our services. Please take a few minutes to complete the written survey that you may receive in the mail after your visit with us. Thank you!             Your Updated Medication List - Protect others around you: Learn how to safely use, store and throw away your medicines at www.disposemymeds.org.          This list is accurate as of 7/26/18 10:30 AM.  Always use your most recent med list.                   Brand Name Dispense Instructions for use Diagnosis    calcium 600 + D 600-400 MG-UNIT per tablet   Generic drug:  calcium-vitamin D     0    take BID        estradiol 0.1 MG/GM cream    ESTRACE VAGINAL    127.5 g    PLACE 2 G VAGINALLY TWICE A WEEK    Vaginal atrophy       MULTIVITAMIN TABS   OR      one tab daily    Routine general medical examination at a health care facility

## 2018-10-19 ENCOUNTER — HOSPITAL ENCOUNTER (OUTPATIENT)
Dept: MAMMOGRAPHY | Facility: CLINIC | Age: 68
Discharge: HOME | End: 2018-10-19
Attending: PHYSICIAN ASSISTANT | Admitting: PHYSICIAN ASSISTANT
Payer: MEDICARE

## 2018-10-19 DIAGNOSIS — Z12.31 VISIT FOR SCREENING MAMMOGRAM: ICD-10-CM

## 2018-10-19 PROCEDURE — 77067 SCR MAMMO BI INCL CAD: CPT

## 2018-11-05 ENCOUNTER — OFFICE VISIT (OUTPATIENT)
Dept: FAMILY MEDICINE | Facility: CLINIC | Age: 68
End: 2018-11-05
Payer: COMMERCIAL

## 2018-11-05 VITALS
BODY MASS INDEX: 18.87 KG/M2 | TEMPERATURE: 98.6 F | OXYGEN SATURATION: 98 % | WEIGHT: 98 LBS | HEART RATE: 73 BPM | DIASTOLIC BLOOD PRESSURE: 79 MMHG | SYSTOLIC BLOOD PRESSURE: 129 MMHG

## 2018-11-05 DIAGNOSIS — R63.4 UNINTENTIONAL WEIGHT LOSS: Primary | ICD-10-CM

## 2018-11-05 LAB
BASOPHILS # BLD AUTO: 0.1 10E9/L (ref 0–0.2)
BASOPHILS NFR BLD AUTO: 1 %
CRP SERPL-MCNC: <2.9 MG/L (ref 0–8)
DIFFERENTIAL METHOD BLD: NORMAL
EOSINOPHIL # BLD AUTO: 0.1 10E9/L (ref 0–0.7)
EOSINOPHIL NFR BLD AUTO: 2.4 %
ERYTHROCYTE [DISTWIDTH] IN BLOOD BY AUTOMATED COUNT: 12.8 % (ref 10–15)
HCT VFR BLD AUTO: 43.9 % (ref 35–47)
HGB BLD-MCNC: 14.4 G/DL (ref 11.7–15.7)
LYMPHOCYTES # BLD AUTO: 2.1 10E9/L (ref 0.8–5.3)
LYMPHOCYTES NFR BLD AUTO: 35.9 %
MCH RBC QN AUTO: 31.4 PG (ref 26.5–33)
MCHC RBC AUTO-ENTMCNC: 32.8 G/DL (ref 31.5–36.5)
MCV RBC AUTO: 96 FL (ref 78–100)
MONOCYTES # BLD AUTO: 0.6 10E9/L (ref 0–1.3)
MONOCYTES NFR BLD AUTO: 9.5 %
NEUTROPHILS # BLD AUTO: 3 10E9/L (ref 1.6–8.3)
NEUTROPHILS NFR BLD AUTO: 51.2 %
PLATELET # BLD AUTO: 297 10E9/L (ref 150–450)
RBC # BLD AUTO: 4.59 10E12/L (ref 3.8–5.2)
WBC # BLD AUTO: 5.9 10E9/L (ref 4–11)

## 2018-11-05 PROCEDURE — 99214 OFFICE O/P EST MOD 30 MIN: CPT | Performed by: PHYSICIAN ASSISTANT

## 2018-11-05 PROCEDURE — 80053 COMPREHEN METABOLIC PANEL: CPT | Performed by: PHYSICIAN ASSISTANT

## 2018-11-05 PROCEDURE — 84443 ASSAY THYROID STIM HORMONE: CPT | Performed by: PHYSICIAN ASSISTANT

## 2018-11-05 PROCEDURE — 36415 COLL VENOUS BLD VENIPUNCTURE: CPT | Performed by: PHYSICIAN ASSISTANT

## 2018-11-05 PROCEDURE — 85025 COMPLETE CBC W/AUTO DIFF WBC: CPT | Performed by: PHYSICIAN ASSISTANT

## 2018-11-05 PROCEDURE — 86140 C-REACTIVE PROTEIN: CPT | Performed by: PHYSICIAN ASSISTANT

## 2018-11-05 NOTE — MR AVS SNAPSHOT
After Visit Summary   11/5/2018    Rosa M Solano    MRN: [id number]           Patient Information     Date Of Birth          1950        Visit Information        Provider Department      11/5/2018 3:20 PM Je[facility] Kinney [facility]C [facility] [place]        Today's Diagnoses     Unintentional weight loss    -  1       Follow-ups after your visit        Follow-up notes from your care team     Return in about 3 months (around 2/5/2019).      Future tests that were ordered for you today     Open Future Orders        Priority Expected Expires Ordered    **ESR FUTURE anytime Routine 11/5/2018 11/5/2019 11/5/2018    Fecal colorectal cancer screen (FIT) Routine 11/26/2018 1/28/2019 11/5/2018            Who to contact     If you have questions or need follow up information about today's clinic visit or your schedule please contact [facility] directly at [phone].  Normal or non-critical lab and imaging results will be communicated to you by [employer] Learninghart, letter or phone within 4 business days after the clinic has received the results. If you do not hear from us within 7 days, please contact the clinic through [employer] or phone. If you have a critical or abnormal lab result, we will notify you by phone as soon as possible.  Submit refill requests through makr or call your pharmacy and they will forward the refill request to us. Please allow 3 business days for your refill to be completed.          Additional Information About Your Visit        MyChart Information     makr gives you secure access to your electronic health record. If you see a primary care provider, you can also send messages to your care team and make appointments. If you have questions, please call your primary care clinic.  If you do not have a primary care provider, please call [phone] and they will assist you.        Care EveryWhere ID     This is your Care EveryWhere ID. This could be used by  other organizations to access your [place] medical records  [id number]        Your Vitals Were     Pulse Temperature Last Period Pulse Oximetry BMI (Body Mass Index)       73 98.6  F (37  C) (Oral) 05/16/2012 98% 18.87 kg/m2        Blood Pressure from Last 3 Encounters:   11/05/18 129/79   07/26/18 158/74   11/02/17 130/70    Weight from Last 3 Encounters:   11/05/18 98 lb (44.5 kg)   07/26/18 98 lb (44.5 kg)   11/02/17 106 lb 9.6 oz (48.4 kg)              We Performed the Following     CBC with platelets differential     Comprehensive metabolic panel (BMP + Alb, Alk Phos, ALT, AST, Total. Bili, TP)     CRP, inflammation     TSH with free T4 reflex        Primary Care Provider Office Phone # Fax #    Jeovany Kinney PA-C [phone]       [address]  [place] [address]        Equal Access to Services     [facility]: Hadii aad ku hadasho Soomaali, waaxda luqadaha, qaybta kaalmada adeegyada, waxay idiin haymomon trav rodriguez . So [facility] [phone].    ATENCIÓN: Si habla español, tiene a basurto disposición servicios gratuitos de asistencia lingüística. Ll[facility] [phone].    We comply with applicable federal civil rights laws and [place] laws. We do not discriminate on the basis of race, color, national origin, age, disability, sex, sexual orientation, or gender identity.            Thank you!     Thank you for choosing [facility] [place]  for your care. Our goal is always to provide you with excellent care. Hearing back from our patients is one way we can continue to improve our services. Please take a few minutes to complete the written survey that you may receive in the mail after your visit with us. Thank you!             Your Updated Medication List - Protect others around you: Learn how to safely use, store and throw away your medicines at www.disposemymeds.org.          This list is accurate as of 11/5/18  4:14 PM.  Always use your most recent med list.                    Brand Name Dispense Instructions for use Diagnosis    calcium 600 + D 600-400 MG-UNIT per tablet   Generic drug:  calcium carbonate 600 mg-vitamin D 400 units     0    take BID        MULTIVITAMIN TABS   OR      one tab daily    Routine general medical examination at a health care facility

## 2018-11-05 NOTE — PROGRESS NOTES
SUBJECTIVE:   Rosa M Solano is a 68 year old female who presents to clinic today for the following health issues:      Concern -  Weight loss   Onset:  Last 6 months     Description:   Pt has had weight loss without trying to loose weight     Intensity: mild    Progression of Symptoms:  worsening    Accompanying Signs & Symptoms:      Previous history of similar problem:       Precipitating factors:   Worsened by:     Alleviating factors:  Improved by:     Therapies Tried and outcome:       Rosa M presents to the clinic with 6 month hx of weight loss ( about 10 lb) that has been international. She tells me that she retired  Year ago ad has bee exercising more frequently and eating healthy.  initialy after halfway she lost 3-5 lbs but feels like more recently her clothing is fitting more loosely and her freids are telling her that she looks thin. Sine January she has lost 10 lb.  Since her appointment 3 months ago, her weight has remained the same.  She has no symptoms of early satiety, fatigue, night sweats, nausea or poor appetite.  No associated SOB or CP ad no fevers. No hematochezia or melena, no vaginal bleeding or pelvic dscomfort Se is UTD on her cancer screenings.  No family hx of Gi cancers.  No hx of smoking. She eats tree healthy meals daily, usually meat and veggies, fruits and whole graines.  She eats snacks ( usually fruit) twice daily. No new medications. Sees derm yearly for skin checks, hx of BCC, SHe is not worried or stressed about weight gain.        Problem list and histories reviewed & adjusted, as indicated.  Additional history: as documented    Patient Active Problem List   Diagnosis     CARDIOVASCULAR SCREENING; LDL GOAL LESS THAN 160     Advanced directives, counseling/discussion     Seborrheic keratosis     Dysplastic nevi     Right bundle branch block     Past Surgical History:   Procedure Laterality Date     BIOPSY OF BREAST, INCISIONAL  2003    benign left breast mass      BUNIONECTOMY RT/LT      RT     COLONOSCOPY      NL, repeat in 10 years     COLONOSCOPY  2013    NL, repeat 10 years     CRYOCAUTERY OF CERVIX  1981     DILATION AND CURETTAGE, HYSTEROSCOPY DIAGNOSTIC, COMBINED N/A 10/16/2015    Procedure: COMBINED DILATION AND CURETTAGE, HYSTEROSCOPY DIAGNOSTIC;  Surgeon: Zahida Salazar MD;  Location: [facility] REPAIR OF [place],ONE      RT foot second toe     PUNC/ASPIR BREAST CYST      benign right breast cyst       Social History   Substance Use Topics     Smoking status: Never Smoker     Smokeless tobacco: Never Used     Alcohol use No     Family History   Problem Relation Age of Onset     Alzheimer Disease Mother      Cancer - colorectal Father      at age 60     Bladder Cancer Father      90s     Circulatory Maternal Grandmother       of hepatitis at age 65     HEART DISEASE Maternal Grandfather      MI age 50         Current Outpatient Prescriptions   Medication Sig Dispense Refill     CALCIUM 600 + D 600-400 MG-UNIT OR TABS take BID 0 0     MULTIVITAMIN TABS   OR one tab daily (Patient not taking: No sig reported)  0     Allergies   Allergen Reactions     Tetracyclines        Reviewed and updated as needed this visit by clinical staff  Allergies  Meds       Reviewed and updated as needed this visit by Provider         ROS:  Constitutional, HEENT, cardiovascular, pulmonary, GI, , musculoskeletal, neuro, skin, endocrine and psych systems are negative, except as otherwise noted.    OBJECTIVE:     /79  Pulse 73  Temp 98.6  F (37  C) (Oral)  Wt 98 lb (44.5 kg)  LMP 2012  SpO2 98%  BMI 18.87 kg/m2  Body mass index is 18.87 kg/(m^2).  GENERAL: healthy, alert and no distress  EYES: Eyes grossly normal to inspection, PERRL and conjunctivae and sclerae normal  HENT: ear canals and TM's normal, nose and mouth without ulcers or lesions  NECK: no adenopathy, no asymmetry, masses, or scars and thyroid normal to palpation  RESP:  lungs clear to auscultation - no rales, rhonchi or wheezes  BREAST: normal without masses, tenderness or nipple discharge and no palpable axillary masses or adenopathy  CV: regular rate and rhythm, normal S1 S2, no S3 or S4, no murmur, click or rub, no peripheral edema and peripheral pulses strong  ABDOMEN: soft, nontender, no hepatosplenomegaly, no masses and bowel sounds normal   (female): deferred  MS: no gross musculoskeletal defects noted, no edema  SKIN: multiple scattered hemangiomas and seborrheic keratoses  NEURO: Normal strength and tone, mentation intact and speech normal  LYMPH: no cervical, supraclavicular, axillary, or inguinal adenopathy    Diagnostic Test Results:  none     ASSESSMENT/PLAN:       1. Unintentional weight loss  Based on weight in the office over the past 3 months, Rosa M's weight has remained constant.  She is not having associated concerning symptoms at this time.  I suspect that this weight loss is secondary to being more active and eating less since shelter.  Her cancer screening are reassuring and UTD and she has no concerning family hx. Due to her age and weight loss, we will start with labs to assess for a potential cause.  If labs normal, we have agreed to monitor symptoms over the next 1-3 months and she will make an effort to maintain her weight.  If she has continued weight loss that is unintentional or if she develops any new symptoms we will consider more directed imaging.   - Comprehensive metabolic panel (BMP + Alb, Alk Phos, ALT, AST, Total. Bili, TP)  - CBC with platelets differential  - **ESR FUTURE anytime; Future  - CRP, inflammation  - TSH with free T4 reflex  - Fecal colorectal cancer screen (FIT); Future    See Patient Instructions    Jeovany Kinney PA-C  [facility] [place]

## 2018-11-06 LAB
ALBUMIN SERPL-MCNC: 3.9 G/DL (ref 3.4–5)
ALP SERPL-CCNC: 89 U/L (ref 40–150)
ALT SERPL W P-5'-P-CCNC: 24 U/L (ref 0–50)
ANION GAP SERPL CALCULATED.3IONS-SCNC: 8 MMOL/L (ref 3–14)
AST SERPL W P-5'-P-CCNC: 89 U/L (ref 0–45)
BILIRUB SERPL-MCNC: 0.3 MG/DL (ref 0.2–1.3)
BUN SERPL-MCNC: 24 MG/DL (ref 7–30)
CALCIUM SERPL-MCNC: 9.2 MG/DL (ref 8.5–10.1)
CHLORIDE SERPL-SCNC: 104 MMOL/L (ref 94–109)
CO2 SERPL-SCNC: 29 MMOL/L (ref 20–32)
CREAT SERPL-MCNC: 0.91 MG/DL (ref 0.52–1.04)
GFR SERPL CREATININE-BSD FRML MDRD: 62 ML/MIN/1.7M2
GLUCOSE SERPL-MCNC: 83 MG/DL (ref 70–99)
POTASSIUM SERPL-SCNC: 4.1 MMOL/L (ref 3.4–5.3)
PROT SERPL-MCNC: 7.9 G/DL (ref 6.8–8.8)
SODIUM SERPL-SCNC: 141 MMOL/L (ref 133–144)
TSH SERPL DL<=0.005 MIU/L-ACNC: 1.2 MU/L (ref 0.4–4)

## 2018-11-06 PROCEDURE — 82274 ASSAY TEST FOR BLOOD FECAL: CPT | Performed by: PHYSICIAN ASSISTANT

## 2018-11-06 NOTE — PROGRESS NOTES
Rosa M-      I have reviewed the results of your labs from yesterday and everything looks very normal.  You continue to have a slight elevation of your AST level but this has improved a little from last your labs.   Your other liver function tests look normal. Your inflammatory marker test was normal which can be high with certain infections and inflammatory conditions.  You complete blood count is normal.    As we discussed, I think at this time it is safe to monitor your weight for 1-3 months.  Try and focus on eating healthy and balanced meals frequently and you may continue moderate exercise. If you develop any of the symptoms that we spoke about (fatigue, night sweats, poor appetite or pain)or if you continue to lose weight without trying please let me know and we will see you in the office for evaluation.    I suspect that the change in your weight is due to increased activity since your [facility] but we should monitor closely to be sure!    Have a great trip! Let me know if anything comes up!    Jeovany

## 2018-11-08 DIAGNOSIS — R63.4 UNINTENTIONAL WEIGHT LOSS: ICD-10-CM

## 2018-11-08 LAB — HEMOCCULT STL QL IA: NEGATIVE

## 2018-11-09 NOTE — PROGRESS NOTES
Rosa M-  Here are your recent results. They are normal.  If you have any questions please do not hesitate to contact our office via phone  ([phone]) or through [employer].

## 2019-03-11 ENCOUNTER — TELEPHONE (OUTPATIENT)
Dept: FAMILY MEDICINE | Facility: CLINIC | Age: 69
End: 2019-03-11

## 2019-03-11 DIAGNOSIS — N95.2 POST-MENOPAUSAL ATROPHIC VAGINITIS: Primary | ICD-10-CM

## 2019-03-15 RX ORDER — ESTRADIOL 0.1 MG/G
1 CREAM VAGINAL
Qty: 42.5 G | Refills: 3 | Status: SHIPPED | OUTPATIENT
Start: 2019-03-18 | End: 2020-05-15

## 2019-03-15 NOTE — TELEPHONE ENCOUNTER
S/w pt and apologized for not knowing what happened to the message from Monday.  Verified pt is still using the medication which she is.  Pt would like the directions to state to insert 1 gram and not 2 grams twice weekly.    Medication pended with new directions and pharmacy pended.    Wilma SON RN  EP Triage

## 2019-03-15 NOTE — TELEPHONE ENCOUNTER
Patient called regarding this, states that pharmacy has not received any information back. She also reports that her OB instructed her to use 1g 2x/week instead of 2g twice/week of the cream. She would like this prescription and all future medication refills to go to [facility] ([phone]).    Patient ph: [phone] detailed message sherri KRUEGER  Patient Representative - [place]

## 2019-06-17 ENCOUNTER — OFFICE VISIT (OUTPATIENT)
Dept: FAMILY MEDICINE | Facility: CLINIC | Age: 69
End: 2019-06-17
Payer: COMMERCIAL

## 2019-06-17 VITALS
TEMPERATURE: 97 F | RESPIRATION RATE: 14 BRPM | WEIGHT: 100 LBS | BODY MASS INDEX: 19.25 KG/M2 | DIASTOLIC BLOOD PRESSURE: 80 MMHG | OXYGEN SATURATION: 99 % | SYSTOLIC BLOOD PRESSURE: 124 MMHG | HEART RATE: 76 BPM

## 2019-06-17 DIAGNOSIS — H00.014 HORDEOLUM EXTERNUM OF LEFT UPPER EYELID: Primary | ICD-10-CM

## 2019-06-17 PROCEDURE — 99213 OFFICE O/P EST LOW 20 MIN: CPT | Performed by: PHYSICIAN ASSISTANT

## 2019-06-17 RX ORDER — OFLOXACIN 3 MG/ML
SOLUTION/ DROPS OPHTHALMIC
Qty: 1 BOTTLE | Refills: 0 | Status: SHIPPED | OUTPATIENT
Start: 2019-06-17 | End: 2019-08-05

## 2019-06-17 NOTE — PROGRESS NOTES
Subjective     Rosa M Solano is a 68 year old female who presents to clinic today for the following health issues:    HPI   Eye(s) Problem      Duration: 3 days    Description:  Location: left  Pain: no   Redness: YES  Discharge: YES    Accompanying signs and symptoms:     History (Trauma, foreign body exposure,): None    Precipitating or alleviating factors (contact use): none    Therapies tried and outcome: warm compresses; help a bit    Rosa M presents to the clinic with 4 day hx of erythematous irritated bump on her left upper eyelid.  No associated eye drainage or changes in vision.  + contact lens wearer              Reviewed and updated as needed this visit by Provider         Review of Systems   ROS COMP: Constitutional, HEENT, cardiovascular, pulmonary, gi and gu systems are negative, except as otherwise noted.      Objective    LMP 05/16/2012   There is no height or weight on file to calculate BMI.  Physical Exam   GENERAL: healthy, alert and no distress  EYES:  1 erythematous, swollen papule noted on medial aspect of left upper eyelid.  Eyelid eversion showed 2 small adjacent pustules consistent with hordeolum, PERRL and conjunctivae and sclerae normal    Diagnostic Test Results:  Labs reviewed in Epic  none         Assessment & Plan     1. Hordeolum externum of left upper eyelid  2 styes noted to upper eyelid,given the size of these I have advised that she begin a topical antibiotic and continue with warm compresses.  She will be traveling next week.  I have written a script for Augmentin to begin if she develops any swelling around her eye and have advised that she see a doctor if this should occur.  She is agreeable  - ofloxacin (OCUFLOX) 0.3 % ophthalmic solution; 1-2 drops in the affected eye(s) every 2 hours while awake x 2 days then 1-2 drops every 4 hours while awake x 5 days.  Dispense: 1 Bottle; Refill: 0  - amoxicillin-clavulanate (AUGMENTIN) 875-125 MG tablet; Take 1 tablet by mouth 2  times daily for 7 days  Dispense: 14 tablet; Refill: 0  - OPHTHALMOLOGY ADULT REFERRAL       Return in about 2 weeks (around 7/1/2019).    Jeovany Kinney PA-C  [facility] [place]

## 2019-08-02 ASSESSMENT — ACTIVITIES OF DAILY LIVING (ADL): CURRENT_FUNCTION: NO ASSISTANCE NEEDED

## 2019-08-05 ENCOUNTER — OFFICE VISIT (OUTPATIENT)
Dept: FAMILY MEDICINE | Facility: CLINIC | Age: 69
End: 2019-08-05
Payer: COMMERCIAL

## 2019-08-05 ENCOUNTER — TRANSFERRED RECORDS (OUTPATIENT)
Dept: HEALTH INFORMATION MANAGEMENT | Facility: CLINIC | Age: 69
End: 2019-08-05

## 2019-08-05 VITALS
OXYGEN SATURATION: 97 % | RESPIRATION RATE: 14 BRPM | BODY MASS INDEX: 18.88 KG/M2 | SYSTOLIC BLOOD PRESSURE: 126 MMHG | DIASTOLIC BLOOD PRESSURE: 74 MMHG | HEIGHT: 61 IN | WEIGHT: 100 LBS | HEART RATE: 70 BPM | TEMPERATURE: 97.3 F

## 2019-08-05 DIAGNOSIS — Z23 NEED FOR VACCINATION: ICD-10-CM

## 2019-08-05 DIAGNOSIS — Z13.6 CARDIOVASCULAR SCREENING; LDL GOAL LESS THAN 160: ICD-10-CM

## 2019-08-05 DIAGNOSIS — L82.1 SEBORRHEIC KERATOSIS: ICD-10-CM

## 2019-08-05 DIAGNOSIS — Z00.00 ROUTINE GENERAL MEDICAL EXAMINATION AT A HEALTH CARE FACILITY: Primary | ICD-10-CM

## 2019-08-05 LAB
ALBUMIN SERPL-MCNC: 3.8 G/DL (ref 3.4–5)
ALP SERPL-CCNC: 84 U/L (ref 40–150)
ALT SERPL W P-5'-P-CCNC: 21 U/L (ref 0–50)
ANION GAP SERPL CALCULATED.3IONS-SCNC: 8 MMOL/L (ref 3–14)
AST SERPL W P-5'-P-CCNC: 87 U/L (ref 0–45)
BILIRUB SERPL-MCNC: 0.7 MG/DL (ref 0.2–1.3)
BUN SERPL-MCNC: 18 MG/DL (ref 7–30)
CALCIUM SERPL-MCNC: 8.9 MG/DL (ref 8.5–10.1)
CHLORIDE SERPL-SCNC: 107 MMOL/L (ref 94–109)
CHOLEST SERPL-MCNC: 215 MG/DL
CO2 SERPL-SCNC: 27 MMOL/L (ref 20–32)
CREAT SERPL-MCNC: 0.68 MG/DL (ref 0.52–1.04)
GFR SERPL CREATININE-BSD FRML MDRD: >90 ML/MIN/{1.73_M2}
GLUCOSE SERPL-MCNC: 85 MG/DL (ref 70–99)
HDLC SERPL-MCNC: 80 MG/DL
LDLC SERPL CALC-MCNC: 120 MG/DL
NONHDLC SERPL-MCNC: 135 MG/DL
POTASSIUM SERPL-SCNC: 3.9 MMOL/L (ref 3.4–5.3)
PROT SERPL-MCNC: 7.5 G/DL (ref 6.8–8.8)
SODIUM SERPL-SCNC: 142 MMOL/L (ref 133–144)
TRIGL SERPL-MCNC: 77 MG/DL

## 2019-08-05 PROCEDURE — 80061 LIPID PANEL: CPT | Performed by: PHYSICIAN ASSISTANT

## 2019-08-05 PROCEDURE — 90732 PPSV23 VACC 2 YRS+ SUBQ/IM: CPT | Performed by: PHYSICIAN ASSISTANT

## 2019-08-05 PROCEDURE — 36415 COLL VENOUS BLD VENIPUNCTURE: CPT | Performed by: PHYSICIAN ASSISTANT

## 2019-08-05 PROCEDURE — 80053 COMPREHEN METABOLIC PANEL: CPT | Performed by: PHYSICIAN ASSISTANT

## 2019-08-05 PROCEDURE — G0438 PPPS, INITIAL VISIT: HCPCS | Performed by: PHYSICIAN ASSISTANT

## 2019-08-05 PROCEDURE — G0009 ADMIN PNEUMOCOCCAL VACCINE: HCPCS | Performed by: PHYSICIAN ASSISTANT

## 2019-08-05 ASSESSMENT — MIFFLIN-ST. JEOR: SCORE: 927.59

## 2019-08-05 NOTE — PATIENT INSTRUCTIONS
Preventive Health Recommendations    See your health care provider every year to    Review health changes.     Discuss preventive care.      Review your medicines if your doctor has prescribed any.      You no longer need a yearly Pap test unless you've had an abnormal Pap test in the past 10 years. If you have vaginal symptoms, such as bleeding or discharge, be sure to talk with your provider about a Pap test.      Every 1 to 2 years, have a mammogram.  If you are over 69, talk with your health care provider about whether or not you want to continue having screening mammograms.      Every 10 years, have a colonoscopy. Or, have a yearly FIT test (stool test). These exams will check for colon cancer.       Have a cholesterol test every 5 years, or more often if your doctor advises it.       Have a diabetes test (fasting glucose) every three years. If you are at risk for diabetes, you should have this test more often.       At age 65, have a bone density scan (DEXA) to check for osteoporosis (brittle bone disease).    Shots:    Get a flu shot each year.    Get a tetanus shot every 10 years.    Talk to your doctor about your pneumonia vaccines. There are now two you should receive - Pneumovax (PPSV 23) and Prevnar (PCV 13).    Talk to your pharmacist about the shingles vaccine.    Talk to your doctor about the hepatitis B vaccine.    Nutrition:     Eat at least 5 servings of fruits and vegetables each day.      Eat whole-grain bread, whole-wheat pasta and brown rice instead of white grains and rice.      Get adequate about Calcium and Vitamin D.     Lifestyle    Exercise at least 150 minutes a week (30 minutes a day, 5 days a week). This will help you control your weight and prevent disease.      Limit alcohol to one drink per day.      No smoking.       Wear sunscreen to prevent skin cancer.       See your dentist twice a year for an exam and cleaning.      See your eye doctor every 1 to 2 years to screen for  conditions such as glaucoma, macular degeneration, cataracts, etc.    Personalized Prevention Plan  You are due for the preventive services outlined below.  Your care team is available to assist you in scheduling these services.  If you have already completed any of these items, please share that information with your care team to update in your medical record.    Health Maintenance Due   Topic Date Due     Discuss Advance Care Planning  07/25/2019     FALL RISK ASSESSMENT  07/26/2019     Annual Wellness Visit  07/26/2019     Preventive Health Recommendations    See your health care provider every year to    Review health changes.     Discuss preventive care.      Review your medicines if your doctor has prescribed any.      You no longer need a yearly Pap test unless you've had an abnormal Pap test in the past 10 years. If you have vaginal symptoms, such as bleeding or discharge, be sure to talk with your provider about a Pap test.      Every 1 to 2 years, have a mammogram.  If you are over 69, talk with your health care provider about whether or not you want to continue having screening mammograms.      Every 10 years, have a colonoscopy. Or, have a yearly FIT test (stool test). These exams will check for colon cancer.       Have a cholesterol test every 5 years, or more often if your doctor advises it.       Have a diabetes test (fasting glucose) every three years. If you are at risk for diabetes, you should have this test more often.       At age 65, have a bone density scan (DEXA) to check for osteoporosis (brittle bone disease).    Shots:    Get a flu shot each year.    Get a tetanus shot every 10 years.    Talk to your doctor about your pneumonia vaccines. There are now two you should receive - Pneumovax (PPSV 23) and Prevnar (PCV 13).    Talk to your pharmacist about the shingles vaccine.    Talk to your doctor about the hepatitis B vaccine.    Nutrition:     Eat at least 5 servings of fruits and vegetables  each day.      Eat whole-grain bread, whole-wheat pasta and brown rice instead of white grains and rice.      Get adequate about Calcium and Vitamin D.     Lifestyle    Exercise at least 150 minutes a week (30 minutes a day, 5 days a week). This will help you control your weight and prevent disease.      Limit alcohol to one drink per day.      No smoking.       Wear sunscreen to prevent skin cancer.       See your dentist twice a year for an exam and cleaning.      See your eye doctor every 1 to 2 years to screen for conditions such as glaucoma, macular degeneration, cataracts, etc.    Personalized Prevention Plan  You are due for the preventive services outlined below.  Your care team is available to assist you in scheduling these services.  If you have already completed any of these items, please share that information with your care team to update in your medical record.    Health Maintenance Due   Topic Date Due     Discuss Advance Care Planning  07/25/2019     FALL RISK ASSESSMENT  07/26/2019     Annual Wellness Visit  07/26/2019

## 2019-08-05 NOTE — PROGRESS NOTES
"  SUBJECTIVE:   Rosa M Solano is a 68 year old female who presents for Preventive Visit.  click delete button to remove this line now  click delete button to remove this line now  Are you in the first 12 months of your Medicare Part B coverage?  No    Physical Health:     Annual Exam:  In general, how would you rate your overall physical health?: excellent  Frequency of exercise:: 6-7 days/week  Do you usually eat at least 4 servings of fruit and vegetables a day, include whole grains & fiber, and avoid regularly eating high fat or \"junk\" foods? : Yes  Taking medications regularly:: Yes  Medication side effects:: None  Activities of Daily Living: no assistance needed  Home safety: no safety concerns identified  Hearing Impairment:: no hearing concerns  In the past 6 months, have you been bothered by leaking of urine?: No  In general, how would you rate your overall mental or emotional health?: excellent  Additional concerns today:: No  Duration of exercise:: 45-60 minutes    Do you feel safe in your environment? Yes    Do you have a Health Care Directive? Yes: Patient states has Advance Directive and will bring in a copy to clinic.        Fall risk:  Fallen 2 or more times in the past year?: No  Any fall with injury in the past year?: No  click delete button to remove this line now  Cognitive Screenin) Repeat 3 items (Leader, Season, Table)    2) Clock draw: NORMAL  3) 3 item recall: Recalls 2 objects   Results: NORMAL clock, 1-2 items recalled: COGNITIVE IMPAIRMENT LESS LIKELY    Mini-CogTM Copyright WILL Murphy. Licensed by the author for use in [facility]; reprinted with permission (paul@.[facility]). All rights reserved.                  Reviewed and updated as needed this visit by clinical staff         Reviewed and updated as needed this visit by Provider        Social History     Tobacco Use     Smoking status: Never Smoker     Smokeless tobacco: Never Used   Substance Use Topics     Alcohol " use: No     Alcohol/week: 0.0 oz                           Current providers sharing in care for this patient include:   Patient Care Team:  Jeovany Kinney PA-C as PCP - General (Physician Assistant)  Jeovany Kinney PA-C as Assigned PCP    The following health maintenance items are reviewed in Epic and correct as of today:  Health Maintenance   Topic Date Due     ADVANCE CARE PLANNING  07/25/2019     FALL RISK ASSESSMENT  07/26/2019     MEDICARE ANNUAL WELLNESS VISIT  07/26/2019     INFLUENZA VACCINE (1) 09/01/2019     MAMMO SCREENING  10/19/2020     LIPID  07/20/2021     DTAP/TDAP/TD IMMUNIZATION (3 - Td) 07/01/2023     COLONOSCOPY  09/13/2023     DEXA  Completed     HEPATITIS C SCREENING  Completed     PHQ-2  Completed     ZOSTER IMMUNIZATION  Completed     IPV IMMUNIZATION  Aged Out     MENINGITIS IMMUNIZATION  Aged Out     Patient Active Problem List   Diagnosis     CARDIOVASCULAR SCREENING; LDL GOAL LESS THAN 160     Advanced directives, counseling/discussion     Seborrheic keratosis     Dysplastic nevi     Right bundle branch block     Past Surgical History:   Procedure Laterality Date     BIOPSY OF BREAST, INCISIONAL  2003    benign left breast mass     BUNIONECTOMY RT/LT  2007    RT     COLONOSCOPY  2003    NL, repeat in 10 years     COLONOSCOPY  9/13/2013    NL, repeat 10 years     CRYOCAUTERY OF CERVIX  1981     DILATION AND CURETTAGE, HYSTEROSCOPY DIAGNOSTIC, COMBINED N/A 10/16/2015    Procedure: COMBINED DILATION AND CURETTAGE, HYSTEROSCOPY DIAGNOSTIC;  Surgeon: Zahida Salazar MD;  Location: [facility] REPAIR OF [place],ONE  2007    RT foot second toe     PUNC/ASPIR BREAST CYST  2004    benign right breast cyst       Social History     Tobacco Use     Smoking status: Never Smoker     Smokeless tobacco: Never Used   Substance Use Topics     Alcohol use: No     Alcohol/week: 0.0 oz     Family History   Problem Relation Age of Onset     Alzheimer Disease Mother      Cancer -  "colorectal Father         at age 60     Bladder Cancer Father         90s     Circulatory Maternal Grandmother          of hepatitis at age 65     Heart Disease Maternal Grandfather         MI age 50         Current Outpatient Medications   Medication Sig Dispense Refill     CALCIUM 600 + D 600-400 MG-UNIT OR TABS take BID 0 0     estradiol (ESTRACE) 0.1 MG/GM vaginal cream Place 1 g vaginally twice a week 42.5 g 3     MULTIVITAMIN TABS   OR one tab daily  0     Allergies   Allergen Reactions     Tetracyclines      Recent Labs   Lab Test 18  1616 17  0736 16  0836  08/03/15  1622 07/17/15  0807 07/10/14  0758   LDL  --   --  104*  --   --  92 108   HDL  --   --  68  --   --  73 68   TRIG  --   --  65  --   --  79 88   ALT 24 18  --   --   --  17 12   CR 0.91 0.82  --    < >  --  0.64 0.75   GFRESTIMATED 62 70  --    < >  --  >90  Non  GFR Calc   79   GFRESTBLACK 75 85  --    < >  --  >90   GFR Calc   >90   POTASSIUM 4.1 4.1  --    < >  --  4.3 4.0   TSH 1.20  --   --   --  1.07  --  1.26    < > = values in this interval not displayed.      Pneumonia Vaccine:Adults age 65+ who received Pneumovax (PPSV23) at 65 years or older: Should be given PCV13 > 1 year after their most recent PPSV23    ROS:  Constitutional, HEENT, cardiovascular, pulmonary, GI, , musculoskeletal, neuro, skin, endocrine and psych systems are negative, except as otherwise noted.    OBJECTIVE:   LMP 2012  Estimated body mass index is 19.25 kg/m  as calculated from the following:    Height as of 18: 1.535 m (5' 0.43\").    Weight as of 19: 45.4 kg (100 lb).  EXAM:   GENERAL: healthy, alert and no distress  EYES: Eyes grossly normal to inspection, PERRL and conjunctivae and sclerae normal  HENT: ear canals and TM's normal, nose and mouth without ulcers or lesions  NECK: no adenopathy, no asymmetry, masses, or scars and thyroid normal to palpation  RESP: lungs clear to auscultation " "- no rales, rhonchi or wheezes  BREAST: normal without masses, tenderness or nipple discharge and no palpable axillary masses or adenopathy  CV: regular rate and rhythm, normal S1 S2, no S3 or S4, no murmur, click or rub, no peripheral edema and peripheral pulses strong  ABDOMEN: soft, nontender, no hepatosplenomegaly, no masses and bowel sounds normal  MS: no gross musculoskeletal defects noted, no edema  SKIN: no suspicious lesions or rashes  NEURO: Normal strength and tone, mentation intact and speech normal  PSYCH: mentation appears normal, affect normal/bright    Diagnostic Test Results:  Labs reviewed in Epic    ASSESSMENT / PLAN:   1. Routine general medical examination at a health care facility    2. CARDIOVASCULAR SCREENING; LDL GOAL LESS THAN 160  - Lipid Profile (Chol, Trig, HDL, LDL calc)  - Comprehensive metabolic panel (BMP + Alb, Alk Phos, ALT, AST, Total. Bili, TP)    3. Seborrheic keratosis      End of Life Planning:  Patient currently has an advanced directive: Yes.  Practitioner is supportive of decision.    COUNSELING:  Reviewed preventive health counseling, as reflected in patient instructions       Regular exercise       Healthy diet/nutrition       Immunizations    Vaccinated for: Pneumococcal          Estimated body mass index is 19.25 kg/m  as calculated from the following:    Height as of 7/26/18: 1.535 m (5' 0.43\").    Weight as of 6/17/19: 45.4 kg (100 lb).         reports that she has never smoked. She has never used smokeless tobacco.      Appropriate preventive services were discussed with this patient, including applicable screening as appropriate for cardiovascular disease, diabetes, osteopenia/osteoporosis, and glaucoma.  As appropriate for age/gender, discussed screening for colorectal cancer, prostate cancer, breast cancer, and cervical cancer. Checklist reviewing preventive services available has been given to the patient.    Reviewed patients plan of care and provided an AVS. " The Basic Care Plan (routine screening as documented in Health Maintenance) for Rosa M meets the Care Plan requirement. This Care Plan has been established and reviewed with the Patient.    Counseling Resources:  ATP IV Guidelines  Pooled Cohorts Equation Calculator  Breast Cancer Risk Calculator  FRAX Risk Assessment  ICSI Preventive Guidelines  Dietary Guidelines for Americans, 2010  USDA's MyPlate  ASA Prophylaxis  Lung CA Screening    Jeovany Kinney PA-C  [facility] [place]

## 2019-08-06 NOTE — RESULT ENCOUNTER NOTE
Rosa M-  Here are your recent results.           -LDL(bad) cholesterol level is elevated which can increase your heart disease risk. Please continue with your healthy diet and exercise regimen.  We will continue to monitor this.   -Liver and gallbladder tests are stable.  You continue to have a mild elevation of your AST level, but this is improving.  We will continue to monitor this.kidney function is normal (Cr, GFR), sodium is normal, potassium is normal, calcium is normal, glucose is normal.      If you have any questions please do not hesitate to contact our office via phone ([phone]) or you may send me a message via [employer] by clicking the contact my Care Team link.      It was a pleasure participating in your care!    Thank you,    Jeovany Kinney MPH, PA-C  [address] [place] [address] [phone]

## 2019-09-30 ENCOUNTER — HEALTH MAINTENANCE LETTER (OUTPATIENT)
Age: 69
End: 2019-09-30

## 2019-10-21 ENCOUNTER — HOSPITAL ENCOUNTER (OUTPATIENT)
Dept: MAMMOGRAPHY | Facility: CLINIC | Age: 69
Discharge: HOME | End: 2019-10-21
Attending: PHYSICIAN ASSISTANT | Admitting: PHYSICIAN ASSISTANT
Payer: COMMERCIAL

## 2019-10-21 DIAGNOSIS — Z12.31 SCREENING MAMMOGRAM, ENCOUNTER FOR: ICD-10-CM

## 2019-10-21 PROCEDURE — 77067 SCR MAMMO BI INCL CAD: CPT

## 2019-12-16 ENCOUNTER — TRANSFERRED RECORDS (OUTPATIENT)
Dept: HEALTH INFORMATION MANAGEMENT | Facility: CLINIC | Age: 69
End: 2019-12-16

## 2020-05-15 DIAGNOSIS — N95.2 POST-MENOPAUSAL ATROPHIC VAGINITIS: ICD-10-CM

## 2020-05-15 RX ORDER — ESTRADIOL 0.1 MG/G
1 CREAM VAGINAL
Qty: 42.5 G | Refills: 0 | Status: SHIPPED | OUTPATIENT
Start: 2020-05-18 | End: 2020-09-01

## 2020-09-01 ENCOUNTER — OFFICE VISIT (OUTPATIENT)
Dept: FAMILY MEDICINE | Facility: CLINIC | Age: 70
End: 2020-09-01
Payer: COMMERCIAL

## 2020-09-01 VITALS
TEMPERATURE: 97.5 F | BODY MASS INDEX: 19.45 KG/M2 | SYSTOLIC BLOOD PRESSURE: 120 MMHG | HEART RATE: 70 BPM | DIASTOLIC BLOOD PRESSURE: 84 MMHG | HEIGHT: 61 IN | WEIGHT: 103 LBS

## 2020-09-01 DIAGNOSIS — N95.2 POST-MENOPAUSAL ATROPHIC VAGINITIS: ICD-10-CM

## 2020-09-01 DIAGNOSIS — Z00.00 MEDICARE ANNUAL WELLNESS VISIT, SUBSEQUENT: ICD-10-CM

## 2020-09-01 DIAGNOSIS — G31.84 MILD COGNITIVE IMPAIRMENT: ICD-10-CM

## 2020-09-01 DIAGNOSIS — Z12.11 SPECIAL SCREENING FOR MALIGNANT NEOPLASMS, COLON: ICD-10-CM

## 2020-09-01 DIAGNOSIS — Z12.31 VISIT FOR SCREENING MAMMOGRAM: ICD-10-CM

## 2020-09-01 DIAGNOSIS — Z00.00 ENCOUNTER FOR ROUTINE ADULT HEALTH EXAMINATION WITHOUT ABNORMAL FINDINGS: Primary | ICD-10-CM

## 2020-09-01 DIAGNOSIS — Z13.6 CARDIOVASCULAR SCREENING; LDL GOAL LESS THAN 160: ICD-10-CM

## 2020-09-01 DIAGNOSIS — Z00.00 ENCOUNTER FOR MEDICARE ANNUAL WELLNESS EXAM: ICD-10-CM

## 2020-09-01 DIAGNOSIS — Z23 NEED FOR PROPHYLACTIC VACCINATION AND INOCULATION AGAINST INFLUENZA: ICD-10-CM

## 2020-09-01 LAB
ALBUMIN SERPL-MCNC: 3.7 G/DL (ref 3.4–5)
ALP SERPL-CCNC: 85 U/L (ref 40–150)
ALT SERPL W P-5'-P-CCNC: 20 U/L (ref 0–50)
ANION GAP SERPL CALCULATED.3IONS-SCNC: 6 MMOL/L (ref 3–14)
AST SERPL W P-5'-P-CCNC: 87 U/L (ref 0–45)
BILIRUB SERPL-MCNC: 0.7 MG/DL (ref 0.2–1.3)
BUN SERPL-MCNC: 17 MG/DL (ref 7–30)
CALCIUM SERPL-MCNC: 9.2 MG/DL (ref 8.5–10.1)
CHLORIDE SERPL-SCNC: 105 MMOL/L (ref 94–109)
CHOLEST SERPL-MCNC: 212 MG/DL
CO2 SERPL-SCNC: 29 MMOL/L (ref 20–32)
CREAT SERPL-MCNC: 0.7 MG/DL (ref 0.52–1.04)
GFR SERPL CREATININE-BSD FRML MDRD: 89 ML/MIN/{1.73_M2}
GLUCOSE SERPL-MCNC: 91 MG/DL (ref 70–99)
HDLC SERPL-MCNC: 85 MG/DL
LDLC SERPL CALC-MCNC: 112 MG/DL
NONHDLC SERPL-MCNC: 127 MG/DL
POTASSIUM SERPL-SCNC: 4.1 MMOL/L (ref 3.4–5.3)
PROT SERPL-MCNC: 7.9 G/DL (ref 6.8–8.8)
SODIUM SERPL-SCNC: 140 MMOL/L (ref 133–144)
TRIGL SERPL-MCNC: 73 MG/DL

## 2020-09-01 PROCEDURE — 90662 IIV NO PRSV INCREASED AG IM: CPT | Performed by: PHYSICIAN ASSISTANT

## 2020-09-01 PROCEDURE — 80061 LIPID PANEL: CPT | Performed by: PHYSICIAN ASSISTANT

## 2020-09-01 PROCEDURE — 99397 PER PM REEVAL EST PAT 65+ YR: CPT | Mod: 25 | Performed by: PHYSICIAN ASSISTANT

## 2020-09-01 PROCEDURE — G0008 ADMIN INFLUENZA VIRUS VAC: HCPCS | Performed by: PHYSICIAN ASSISTANT

## 2020-09-01 PROCEDURE — 36415 COLL VENOUS BLD VENIPUNCTURE: CPT | Performed by: PHYSICIAN ASSISTANT

## 2020-09-01 PROCEDURE — 80053 COMPREHEN METABOLIC PANEL: CPT | Performed by: PHYSICIAN ASSISTANT

## 2020-09-01 RX ORDER — ESTRADIOL 0.1 MG/G
1 CREAM VAGINAL
Qty: 42.5 G | Refills: 3 | Status: SHIPPED | OUTPATIENT
Start: 2020-09-03 | End: 2021-10-04

## 2020-09-01 ASSESSMENT — ACTIVITIES OF DAILY LIVING (ADL): CURRENT_FUNCTION: NO ASSISTANCE NEEDED

## 2020-09-01 ASSESSMENT — MIFFLIN-ST. JEOR: SCORE: 936.24

## 2020-09-01 NOTE — PROGRESS NOTES
"SUBJECTIVE:   Rosa M Solano is a 69 year old female who presents for Preventive Visit.    Are you in the first 12 months of your Medicare coverage?  No    Healthy Habits:    In general, how would you rate your overall health?  Very good    Frequency of exercise:  4-5 days/week    Duration of exercise:  45-60 minutes    Do you usually eat at least 4 servings of fruit and vegetables a day, include whole grains    & fiber and avoid regularly eating high fat or \"junk\" foods?  Yes    Taking medications regularly:  Not Applicable    Barriers to taking medications:  Not applicable    Medication side effects:  None    Ability to successfully perform activities of daily living:  No assistance needed    Home Safety:  No safety concerns identified    Hearing Impairment:  No hearing concerns    In the past 6 months, have you been bothered by leaking of urine?  No    In general, how would you rate your overall mental or emotional health?  Good      PHQ-2 Total Score:    Additional concerns today:  Yes         Mild cognitive impairment:  Diagnosed in 2015 by neurology when starting to notice memory difficulty. She is still having trouble with word finding and recall intermittently but this is not interfering with daily activities.  Others have not noticed.       Do you feel safe in your environment? Yes    Have you ever done Advance Care Planning? (For example, a Health Directive, POLST, or a discussion with a medical provider or your loved ones about your wishes): Yes, patient states has an Advance Care Planning document and will bring a copy to the clinic.      Fall risk  Fallen 2 or more times in the past year?: No  Any fall with injury in the past year?: No    Cognitive Screening   1) Repeat 3 items (Leader, Season, Table)    2) Clock draw: NORMAL  3) 3 item recall: Recalls 2 objects   Results: NORMAL clock, 1-2 items recalled: COGNITIVE IMPAIRMENT LESS LIKELY    Mini-CogTM Copyright S Jeffrey. Licensed by the author for " use in [facility]; reprinted with permission (paul@.[facility]). All rights reserved.      Do you have sleep apnea, excessive snoring or daytime drowsiness?: no    Reviewed and updated as needed this visit by clinical staff  Tobacco  Allergies  Meds  Med Hx  Surg Hx  Fam Hx  Soc Hx        Reviewed and updated as needed this visit by Provider  Tobacco  Med Hx  Surg Hx  Fam Hx  Soc Hx       Social History     Tobacco Use     Smoking status: Never Smoker     Smokeless tobacco: Never Used   Substance Use Topics     Alcohol use: No     Alcohol/week: 0.0 standard drinks     If you drink alcohol do you typically have >3 drinks per day or >7 drinks per week? No    No flowsheet data found.            Current providers sharing in care for this patient include:   Patient Care Team:  Jeovany Kinney PA-C as PCP - General (Physician Assistant)  Jeovany Kinney PA-C as Assigned PCP    The following health maintenance items are reviewed in Epic and correct as of today:  Health Maintenance   Topic Date Due     ADVANCE CARE PLANNING  07/25/2019     PHQ-2  01/01/2020     FALL RISK ASSESSMENT  08/05/2020     INFLUENZA VACCINE (1) 09/01/2020     MEDICARE ANNUAL WELLNESS VISIT  08/05/2020     MAMMO SCREENING  10/21/2021     DTAP/TDAP/TD IMMUNIZATION (3 - Td) 07/01/2023     COLORECTAL CANCER SCREENING  09/13/2023     LIPID  08/05/2024     DEXA  Completed     HEPATITIS C SCREENING  Completed     PNEUMOCOCCAL IMMUNIZATION 65+ LOW/MEDIUM RISK  Completed     ZOSTER IMMUNIZATION  Completed     IPV IMMUNIZATION  Aged Out     MENINGITIS IMMUNIZATION  Aged Out     HEPATITIS B IMMUNIZATION  Aged Out     Patient Active Problem List   Diagnosis     CARDIOVASCULAR SCREENING; LDL GOAL LESS THAN 160     Advanced directives, counseling/discussion     Seborrheic keratosis     Dysplastic nevi     Right bundle branch block     Mild cognitive impairment     Past Surgical History:   Procedure Laterality Date     BIOPSY OF  BREAST, INCISIONAL      benign left breast mass     BUNIONECTOMY RT/LT      RT     COLONOSCOPY      NL, repeat in 10 years     COLONOSCOPY  2013    NL, repeat 10 years     CRYOCAUTERY OF CERVIX  1981     DILATION AND CURETTAGE, HYSTEROSCOPY DIAGNOSTIC, COMBINED N/A 10/16/2015    Procedure: COMBINED DILATION AND CURETTAGE, HYSTEROSCOPY DIAGNOSTIC;  Surgeon: Zahida Salazar MD;  Location: [facility] REPAIR OF HAMMERTOE,ONE      RT foot second toe     PUNC/ASPIR BREAST CYST      benign right breast cyst       Social History     Tobacco Use     Smoking status: Never Smoker     Smokeless tobacco: Never Used   Substance Use Topics     Alcohol use: No     Alcohol/week: 0.0 standard drinks     Family History   Problem Relation Age of Onset     Alzheimer Disease Mother      Cancer - colorectal Father         at age 60     Bladder Cancer Father         90s     Circulatory Maternal Grandmother          of hepatitis at age 65     Heart Disease Maternal Grandfather         MI age 50         Current Outpatient Medications   Medication Sig Dispense Refill     CALCIUM 600 + D 600-400 MG-UNIT OR TABS take BID 0 0     [START ON 9/3/2020] estradiol (ESTRACE) 0.1 MG/GM vaginal cream Place 1 g vaginally twice a week 42.5 g 3     MULTIVITAMIN TABS   OR one tab daily  0     Allergies   Allergen Reactions     Tetracyclines      Recent Labs   Lab Test 19  0840 18  1616 17  0736 16  0836  08/03/15  1622 07/17/15  0807   *  --   --  104*  --   --  92   HDL 80  --   --  68  --   --  73   TRIG 77  --   --  65  --   --  79   ALT 21 24 18  --   --   --  17   CR 0.68 0.91 0.82  --    < >  --  0.64   GFRESTIMATED >90 62 70  --    < >  --  >90  Non  GFR Calc     GFRESTBLACK >90 75 85  --    < >  --  >90   GFR Calc     POTASSIUM 3.9 4.1 4.1  --    < >  --  4.3   TSH  --  1.20  --   --   --  1.07  --     < > = values in this interval not displayed.     "  Pneumonia Vaccine:done      Review of Systems  Constitutional, HEENT, cardiovascular, pulmonary, GI, , musculoskeletal, neuro, skin, endocrine and psych systems are negative, except as otherwise noted.    OBJECTIVE:   /84   Pulse 70   Temp 97.5  F (36.4  C) (Tympanic)   Ht 1.544 m (5' 0.79\")   Wt 46.7 kg (103 lb)   LMP 05/16/2012   BMI 19.60 kg/m   Estimated body mass index is 19.6 kg/m  as calculated from the following:    Height as of this encounter: 1.544 m (5' 0.79\").    Weight as of this encounter: 46.7 kg (103 lb).  Physical Exam  GENERAL: healthy, alert and no distress  EYES: Eyes grossly normal to inspection, PERRL and conjunctivae and sclerae normal  HENT: ear canals and TM's normal, nose and mouth without ulcers or lesions  NECK: no adenopathy, no asymmetry, masses, or scars and thyroid normal to palpation  RESP: lungs clear to auscultation - no rales, rhonchi or wheezes  CV: regular rate and rhythm, normal S1 S2, no S3 or S4, no murmur, click or rub, no peripheral edema and peripheral pulses strong  ABDOMEN: soft, nontender, no hepatosplenomegaly, no masses and bowel sounds normal  MS: no gross musculoskeletal defects noted, no edema  SKIN: no suspicious lesions or rashes  NEURO: Normal strength and tone, mentation intact and speech normal  PSYCH: mentation appears normal, affect normal/bright    Diagnostic Test Results:  Labs reviewed in Epic    ASSESSMENT / PLAN:   1. Encounter for routine adult health examination without abnormal findings    2. Medicare annual wellness visit, subsequent    3. Mild cognitive impairment  Continue to monitor.  Advised to continue to exercise, eat healthy and participate in activities such as reading and games that can help maintain good cognition.     4. Post-menopausal atrophic vaginitis  - estradiol (ESTRACE) 0.1 MG/GM vaginal cream; Place 1 g vaginally twice a week  Dispense: 42.5 g; Refill: 3    5. CARDIOVASCULAR SCREENING; LDL GOAL LESS THAN 160  - " "Lipid Profile (Chol, Trig, HDL, LDL calc)  - Comprehensive metabolic panel (BMP + Alb, Alk Phos, ALT, AST, Total. Bili, TP)    6. Special screening for malignant neoplasms, colo  - Fecal colorectal cancer screen (FIT); Future    7. Visit for screening mammogram  - *MA Screening Digital Bilateral; Future    8. Need for prophylactic vaccination and inoculation against influenza  - FLUZONE HIGH DOSE 65+  [74196]    COUNSELING:  Reviewed preventive health counseling, as reflected in patient instructions       Regular exercise       Healthy diet/nutrition       Colon cancer screening    Estimated body mass index is 19.6 kg/m  as calculated from the following:    Height as of this encounter: 1.544 m (5' 0.79\").    Weight as of this encounter: 46.7 kg (103 lb).        She reports that she has never smoked. She has never used smokeless tobacco.      Appropriate preventive services were discussed with this patient, including applicable screening as appropriate for cardiovascular disease, diabetes, osteopenia/osteoporosis, and glaucoma.  As appropriate for age/gender, discussed screening for colorectal cancer, prostate cancer, breast cancer, and cervical cancer. Checklist reviewing preventive services available has been given to the patient.    Reviewed patients plan of care and provided an AVS. The Basic Care Plan (routine screening as documented in Health Maintenance) for Rosa M meets the Care Plan requirement. This Care Plan has been established and reviewed with the Patient.    Counseling Resources:  ATP IV Guidelines  Pooled Cohorts Equation Calculator  Breast Cancer Risk Calculator  Breast Cancer: Medication to Reduce Risk  FRAX Risk Assessment  ICSI Preventive Guidelines  Dietary Guidelines for Americans, 2010  [employer]ok[employer]'s MyPlate  ASA Prophylaxis  Lung CA Screening    Jeovany Kinney PA-C  [facility] [place]    Identified Health Risks:  "

## 2020-09-01 NOTE — PATIENT INSTRUCTIONS
Patient Education   Personalized Prevention Plan  You are due for the preventive services outlined below.  Your care team is available to assist you in scheduling these services.  If you have already completed any of these items, please share that information with your care team to update in your medical record.  Health Maintenance Due   Topic Date Due     Discuss Advance Care Planning  07/25/2019     PHQ-2  01/01/2020     FALL RISK ASSESSMENT  08/05/2020     Flu Vaccine (1) 09/01/2020     Annual Wellness Visit  08/05/2020

## 2020-09-02 DIAGNOSIS — Z12.11 SPECIAL SCREENING FOR MALIGNANT NEOPLASMS, COLON: ICD-10-CM

## 2020-09-02 PROCEDURE — 82274 ASSAY TEST FOR BLOOD FECAL: CPT | Performed by: PHYSICIAN ASSISTANT

## 2020-09-03 NOTE — RESULT ENCOUNTER NOTE
Rosa M-        -LDL(bad) cholesterol level is slightly elevated. No medication is needed at this time.  I recommend continuing your exercise routine and eating a low saturated fat/low carbohydrate diet  To combat this.   -Your AST level ( liver function test) is very slightly elevated and is stable from your previus results. Your workup for this has been normal in the past and so we will continue to monitor this yearly.   -Kidney function (GFR) is normal.  -Sodium is normal.  -Potassium is normal.  -Calcium is normal.  -Glucose (diabetic screening test) is normal.    If you have any questions please do not hesitate to contact our office via phone ([phone]) or you may send me a message via [employer] by clicking the contact my Care Team link.      It was a pleasureparticipating in your care!    Thank you,    Jeovany Kinney MPH, PA-C  [address] [place] [address]  [phone]

## 2020-09-09 LAB — HEMOCCULT STL QL IA: NEGATIVE

## 2020-09-09 NOTE — RESULT ENCOUNTER NOTE
Rosa M-  Here are your recent results.      -FIT test (screening test for colon cancer) was normal. ADVISE: rechecking this test in 1 year.    If you have any questions please do not hesitate to contact our office via phone ([phone]) or you may send me a message via [employer] by clicking the contact my Care Team link.      It was a pleasure participating in your care!    Thank you,    Jeovany Kinney MPH, PA-C  [address] [place] [address] [phone]

## 2020-11-06 ENCOUNTER — HOSPITAL ENCOUNTER (OUTPATIENT)
Dept: MAMMOGRAPHY | Facility: CLINIC | Age: 70
Discharge: HOME | End: 2020-11-06
Attending: PHYSICIAN ASSISTANT | Admitting: PHYSICIAN ASSISTANT
Payer: COMMERCIAL

## 2020-11-06 DIAGNOSIS — Z12.31 VISIT FOR SCREENING MAMMOGRAM: ICD-10-CM

## 2020-11-06 PROCEDURE — 77067 SCR MAMMO BI INCL CAD: CPT

## 2021-03-27 ENCOUNTER — MYC MEDICAL ADVICE (OUTPATIENT)
Dept: FAMILY MEDICINE | Facility: CLINIC | Age: 71
End: 2021-03-27

## 2021-07-24 ENCOUNTER — MYC MEDICAL ADVICE (OUTPATIENT)
Dept: FAMILY MEDICINE | Facility: CLINIC | Age: 71
End: 2021-07-24

## 2021-07-26 ENCOUNTER — TELEPHONE (OUTPATIENT)
Dept: FAMILY MEDICINE | Facility: CLINIC | Age: 71
End: 2021-07-26

## 2021-07-30 ENCOUNTER — OFFICE VISIT (OUTPATIENT)
Dept: FAMILY MEDICINE | Facility: CLINIC | Age: 71
End: 2021-07-30
Payer: COMMERCIAL

## 2021-07-30 VITALS
HEART RATE: 79 BPM | BODY MASS INDEX: 19.63 KG/M2 | OXYGEN SATURATION: 100 % | WEIGHT: 104 LBS | HEIGHT: 61 IN | SYSTOLIC BLOOD PRESSURE: 110 MMHG | TEMPERATURE: 97.9 F | DIASTOLIC BLOOD PRESSURE: 74 MMHG

## 2021-07-30 DIAGNOSIS — Z01.818 PREOP GENERAL PHYSICAL EXAM: Primary | ICD-10-CM

## 2021-07-30 DIAGNOSIS — H26.9 CATARACT, UNSPECIFIED CATARACT TYPE, UNSPECIFIED LATERALITY: ICD-10-CM

## 2021-07-30 PROCEDURE — 99204 OFFICE O/P NEW MOD 45 MIN: CPT | Performed by: PHYSICIAN ASSISTANT

## 2021-07-30 PROCEDURE — 93000 ELECTROCARDIOGRAM COMPLETE: CPT | Performed by: PHYSICIAN ASSISTANT

## 2021-07-30 ASSESSMENT — MIFFLIN-ST. JEOR: SCORE: 935.78

## 2021-07-30 ASSESSMENT — PAIN SCALES - GENERAL: PAINLEVEL: NO PAIN (0)

## 2021-07-30 NOTE — PROGRESS NOTES
[address] [address]  Phone: [phone]  Primary Provider: Jeovany Byrd  Pre-op Performing Provider: JEOVANY BYRD      PREOPERATIVE EVALUATION:  Today's date: 7/30/2021    Rosa M Solano is a 70 year old female who presents for a preoperative evaluation.    Surgical Information:  Surgery/Procedure: cataract  Surgery Location: [facility]  Surgeon: [facility]  Surgery Date: 8/17 right eye 8/25 left eye  Time of Surgery:   Where patient plans to recover: At home with family  Fax number for surgical facility: [phone]    Type of Anesthesia Anticipated: Local with MAC    Assessment & Plan     The proposed surgical procedure is considered LOW risk.    1. Preop general physical exam  - EKG 12-lead complete w/read - Clinics      2. Cataract, unspecified cataract type, unspecified laterality             Risks and Recommendations:  The patient has the following additional risks and recommendations for perioperative complications:   - No identified additional risk factors other than previously addressed    Medication Instructions:  Patient is on no chronic medications    RECOMMENDATION:  APPROVAL GIVEN to proceed with proposed procedure, without further diagnostic evaluation.      Subjective     HPI related to upcoming procedure: Felipe presents to the clinic for preoperative exam prior to cataract procedure.  She is feeling well today, no concerns.     Preop Questions 7/23/2021   1. Have you ever had a heart attack or stroke? No   2. Have you ever had surgery on your heart or blood vessels, such as a stent placement, a coronary artery bypass, or surgery on an artery in your head, neck, heart, or legs? No   3. Do you have chest pain with activity? No   4. Do you have a history of  heart failure? No   5. Do you currently have a cold, bronchitis or symptoms of other infection? No   6. Do you have a cough, shortness of breath, or wheezing?  No   7. Do you or anyone in your family have previous history of blood clots? No   8. Do you or does anyone in your family have a serious bleeding problem such as prolonged bleeding following surgeries or cuts? No   9. Have you ever had problems with anemia or been told to take iron pills? No   10. Have you had any abnormal blood loss such as black, tarry or bloody stools, or abnormal vaginal bleeding? No   11. Have you ever had a blood transfusion? No   12. Are you willing to have a blood transfusion if it is medically needed before, during, or after your surgery? Yes   13. Have you or any of your relatives ever had problems with anesthesia? No   14. Do you have sleep apnea, excessive snoring or daytime drowsiness? No   15. Do you have any artifical heart valves or other implanted medical devices like a pacemaker, defibrillator, or continuous glucose monitor? No   16. Do you have artificial joints? No   17. Are you allergic to latex? No       Health Care Directive:  Patient has a Health Care Directive on file      Preoperative Review of :   reviewed - no record of controlled substances prescribed.      Status of Chronic Conditions:  See problem list for active medical problems.  Problems all longstanding and stable, except as noted/documented.  See ROS for pertinent symptoms related to these conditions.      Review of Systems  CONSTITUTIONAL: NEGATIVE for fever, chills, change in weight  INTEGUMENTARY/SKIN: NEGATIVE for worrisome rashes, moles or lesions  EYES: NEGATIVE for vision changes or irritation  ENT/MOUTH: NEGATIVE for ear, mouth and throat problems  RESP: NEGATIVE for significant cough or SOB  CV: NEGATIVE for chest pain, palpitations or peripheral edema  GI: NEGATIVE for nausea, abdominal pain, heartburn, or change in bowel habits  : NEGATIVE for frequency, dysuria, or hematuria  MUSCULOSKELETAL: NEGATIVE for significant arthralgias or myalgia  NEURO: NEGATIVE for weakness, dizziness or  paresthesias  ENDOCRINE: NEGATIVE for temperature intolerance, skin/hair changes  HEME: NEGATIVE for bleeding problems  PSYCHIATRIC: NEGATIVE for changes in mood or affect    Patient Active Problem List    Diagnosis Date Noted     Mild cognitive impairment 09/01/2020     Priority: Medium     Right bundle branch block 10/08/2015     Priority: Medium     Seborrheic keratosis 07/10/2014     Priority: Medium     Dysplastic nevi 07/10/2014     Priority: Medium     Advanced directives, counseling/discussion 06/26/2012     Priority: Medium     Advance Care Planning:   Receipt of ACP document:  Received: Health Care Directive which was witnessed or notarized on 5/9/14.  Document not previously scanned.  Validation form completed and sent with document to be scanned.  Code Status needs to be updated to reflect choices in most recent ACP document. Orders placed.  Confirmed/documented designated decision maker(s). See permanent comments section of demographics in clinical tab. View document(s) and details by clicking on code status.   Added by Josefina Jacbos on 7/25/2014.               CARDIOVASCULAR SCREENING; LDL GOAL LESS THAN 160 10/31/2010     Priority: Medium      Past Medical History:   Diagnosis Date     Bundle branch block right 2007     Diffuse cystic mastopathy      Dysplastic nevi      Infectious mononucleosis 1964     Leiomyoma of uterus, unspecified 2004    3cm fibroid per ultrasound     Lump or mass in breast 2004    aspiration large right breast cyst     Periodontitis      Seborrheic keratosis      Unexplained endometrial cells on cervical Pap smear 7/2015    postmenopausal     Past Surgical History:   Procedure Laterality Date     BIOPSY OF BREAST, INCISIONAL  2003    benign left breast mass     BUNIONECTOMY RT/LT  2007    RT     COLONOSCOPY  2003    NL, repeat in 10 years     COLONOSCOPY  9/13/2013    NL, repeat 10 years     CRYOCAUTERY OF CERVIX  1981     DILATION AND CURETTAGE, HYSTEROSCOPY DIAGNOSTIC,  COMBINED N/A 10/16/2015    Procedure: COMBINED DILATION AND CURETTAGE, HYSTEROSCOPY DIAGNOSTIC;  Surgeon: Zahida Salazar MD;  Location: [facility] REPAIR OF HAMMERTOE,ONE      RT foot second toe     PUNC/ASPIR BREAST CYST      benign right breast cyst     Current Outpatient Medications   Medication Sig Dispense Refill     CALCIUM 600 + D 600-400 MG-UNIT OR TABS take BID 0 0     estradiol (ESTRACE) 0.1 MG/GM vaginal cream Place 1 g vaginally twice a week 42.5 g 3     MULTIVITAMIN TABS   OR one tab daily  0       Allergies   Allergen Reactions     Tetracyclines         Social History     Tobacco Use     Smoking status: Never Smoker     Smokeless tobacco: Never Used   Substance Use Topics     Alcohol use: No     Alcohol/week: 0.0 standard drinks     Family History   Problem Relation Age of Onset     Alzheimer Disease Mother      Cancer - colorectal Father         at age 60     Bladder Cancer Father         90s     Circulatory Maternal Grandmother          of hepatitis at age 65     Heart Disease Maternal Grandfather         MI age 50     History   Drug Use No         Objective     LMP 2012     Physical Exam    GENERAL APPEARANCE: healthy, alert and no distress     EYES: EOMI, PERRL     HENT: ear canals and TM's normal and nose and mouth without ulcers or lesions     NECK: no adenopathy, no asymmetry, masses, or scars and thyroid normal to palpation     RESP: lungs clear to auscultation - no rales, rhonchi or wheezes     CV: regular rates and rhythm, normal S1 S2, no S3 or S4 and no murmur, click or rub     ABDOMEN:  soft, nontender, no HSM or masses and bowel sounds normal     MS: extremities normal- no gross deformities noted, no evidence of inflammation in joints, FROM in all extremities.     SKIN: no suspicious lesions or rashes     NEURO: Normal strength and tone, sensory exam grossly normal, mentation intact and speech normal     PSYCH: mentation appears normal. and affect  normal/bright    Recent Labs   Lab Test 09/01/20  1017 08/05/19  0840    142   POTASSIUM 4.1 3.9   CR 0.70 0.68        Diagnostics:  No labs were ordered during this visit.   EKG:, NSR, RBBB unchanged from previous exam    Revised Cardiac Risk Index (RCRI):  The patient has the following serious cardiovascular risks for perioperative complications:   - No serious cardiac risks = 0 points     RCRI Interpretation: 0 points: Class I (very low risk - 0.4% complication rate)           Signed Electronically by: Jeovany Kinney PA-C  Copy of this evaluation report is provided to requesting physician.

## 2021-07-30 NOTE — PATIENT INSTRUCTIONS

## 2021-10-01 DIAGNOSIS — N95.2 POST-MENOPAUSAL ATROPHIC VAGINITIS: ICD-10-CM

## 2021-10-04 RX ORDER — ESTRADIOL 0.1 MG/G
CREAM VAGINAL
Qty: 42.5 G | Refills: 3 | Status: SHIPPED | OUTPATIENT
Start: 2021-10-04 | End: 2022-12-09

## 2021-10-24 ENCOUNTER — HEALTH MAINTENANCE LETTER (OUTPATIENT)
Age: 71
End: 2021-10-24

## 2021-11-16 ENCOUNTER — ANCILLARY PROCEDURE (OUTPATIENT)
Dept: MAMMOGRAPHY | Facility: CLINIC | Age: 71
End: 2021-11-16
Attending: PHYSICIAN ASSISTANT
Payer: COMMERCIAL

## 2021-11-16 DIAGNOSIS — Z12.31 VISIT FOR SCREENING MAMMOGRAM: ICD-10-CM

## 2021-11-16 PROCEDURE — 77063 BREAST TOMOSYNTHESIS BI: CPT | Mod: TC | Performed by: RADIOLOGY

## 2021-11-16 PROCEDURE — 77067 SCR MAMMO BI INCL CAD: CPT | Mod: TC | Performed by: RADIOLOGY

## 2021-12-01 ENCOUNTER — E-VISIT (OUTPATIENT)
Dept: FAMILY MEDICINE | Facility: CLINIC | Age: 71
End: 2021-12-01
Payer: COMMERCIAL

## 2021-12-01 DIAGNOSIS — M79.604 PAIN OF RIGHT LOWER EXTREMITY: Primary | ICD-10-CM

## 2021-12-01 PROCEDURE — 99421 OL DIG E/M SVC 5-10 MIN: CPT | Performed by: INTERNAL MEDICINE

## 2021-12-30 ENCOUNTER — OFFICE VISIT (OUTPATIENT)
Dept: FAMILY MEDICINE | Facility: CLINIC | Age: 71
End: 2021-12-30
Payer: COMMERCIAL

## 2021-12-30 VITALS
OXYGEN SATURATION: 98 % | SYSTOLIC BLOOD PRESSURE: 130 MMHG | TEMPERATURE: 97.4 F | BODY MASS INDEX: 20.01 KG/M2 | HEART RATE: 80 BPM | RESPIRATION RATE: 16 BRPM | WEIGHT: 106 LBS | DIASTOLIC BLOOD PRESSURE: 80 MMHG | HEIGHT: 61 IN

## 2021-12-30 DIAGNOSIS — Z01.818 PREOP GENERAL PHYSICAL EXAM: Primary | ICD-10-CM

## 2021-12-30 DIAGNOSIS — M21.611 BUNION, RIGHT: ICD-10-CM

## 2021-12-30 PROCEDURE — 93000 ELECTROCARDIOGRAM COMPLETE: CPT | Performed by: PHYSICIAN ASSISTANT

## 2021-12-30 PROCEDURE — 99214 OFFICE O/P EST MOD 30 MIN: CPT | Performed by: PHYSICIAN ASSISTANT

## 2021-12-30 ASSESSMENT — ENCOUNTER SYMPTOMS
CARDIOVASCULAR NEGATIVE: 1
NEUROLOGICAL NEGATIVE: 1
CONSTITUTIONAL NEGATIVE: 1
RESPIRATORY NEGATIVE: 1
PSYCHIATRIC NEGATIVE: 1
EYES NEGATIVE: 1
GASTROINTESTINAL NEGATIVE: 1

## 2021-12-30 ASSESSMENT — MIFFLIN-ST. JEOR: SCORE: 939.85

## 2021-12-30 ASSESSMENT — PAIN SCALES - GENERAL: PAINLEVEL: NO PAIN (0)

## 2021-12-30 NOTE — PROGRESS NOTES
[address] [address]  Phone: [phone]  Primary Provider: Jeovany Kinney  Pre-op Performing Provider: JOHNNY FAY      PREOPERATIVE EVALUATION:  Today's date: 12/30/2021    Rosa M Solano is a 71 year old female who presents for a preoperative evaluation.    Surgical Information:  Surgery/Procedure: John Harrison bunionectomy, hardware removal, 2nd metatarsal osteotomy, 2nd metatarsal phalangeal joint capsule repair right foot.  Surgery Location: [facility]   Surgeon: Angelina   Surgery Date: 1/10/2022  Time of Surgery: TBD   Where patient plans to recover: At home with family  Fax number for surgical facility: [phone]    Type of Anesthesia Anticipated: to be determined    Assessment & Plan     The proposed surgical procedure is considered INTERMEDIATE risk.    Problem List Items Addressed This Visit     None      Visit Diagnoses     Preop general physical exam    -  Primary    Bunion, right               Risks and Recommendations:  The patient has the following additional risks and recommendations for perioperative complications:   - No identified additional risk factors other than previously addressed    Medication Instructions:  Patient is on no chronic medications    RECOMMENDATION:  APPROVAL GIVEN to proceed with proposed procedure, without further diagnostic evaluation.              22 minutes spent on the date of the encounter doing chart review, history and exam, documentation and further activities per the note        Subjective     HPI related to upcoming procedure: painful hardware, right bunion     Preop Questions 12/29/2021   1. Have you ever had a heart attack or stroke? No   2. Have you ever had surgery on your heart or blood vessels, such as a stent placement, a coronary artery bypass, or surgery on an artery in your head, neck, heart, or legs? No   3. Do you have chest pain with  activity? No   4. Do you have a history of  heart failure? No   5. Do you currently have a cold, bronchitis or symptoms of other infection? No   6. Do you have a cough, shortness of breath, or wheezing? No   7. Do you or anyone in your family have previous history of blood clots? No   8. Do you or does anyone in your family have a serious bleeding problem such as prolonged bleeding following surgeries or cuts? No   9. Have you ever had problems with anemia or been told to take iron pills? No   10. Have you had any abnormal blood loss such as black, tarry or bloody stools, or abnormal vaginal bleeding? No   11. Have you ever had a blood transfusion? No   12. Are you willing to have a blood transfusion if it is medically needed before, during, or after your surgery? Yes   13. Have you or any of your relatives ever had problems with anesthesia? No   14. Do you have sleep apnea, excessive snoring or daytime drowsiness? No   15. Do you have any artifical heart valves or other implanted medical devices like a pacemaker, defibrillator, or continuous glucose monitor? No   16. Do you have artificial joints? No   17. Are you allergic to latex? No       Health Care Directive:  Patient has a Health Care Directive on file      Preoperative Review of :   reviewed - no record of controlled substances prescribed.          Review of Systems   Constitutional: Negative.    HENT: Negative.    Eyes: Negative.    Respiratory: Negative.    Cardiovascular: Negative.    Gastrointestinal: Negative.    Genitourinary: Negative.    Musculoskeletal:        As in HPI   Neurological: Negative.    Psychiatric/Behavioral: Negative.          Patient Active Problem List    Diagnosis Date Noted     Mild cognitive impairment 09/01/2020     Priority: Medium     Right bundle branch block 10/08/2015     Priority: Medium     Seborrheic keratosis 07/10/2014     Priority: Medium     Dysplastic nevi 07/10/2014     Priority: Medium     Advanced  directives, counseling/discussion 06/26/2012     Priority: Medium     Advance Care Planning:   Receipt of ACP document:  Received: Health Care Directive which was witnessed or notarized on 5/9/14.  Document not previously scanned.  Validation form completed and sent with document to be scanned.  Code Status needs to be updated to reflect choices in most recent ACP document. Orders placed.  Confirmed/documented designated decision maker(s). See permanent comments section of demographics in clinical tab. View document(s) and details by clicking on code status.   Added by Josefina Jacobs on 7/25/2014.               CARDIOVASCULAR SCREENING; LDL GOAL LESS THAN 160 10/31/2010     Priority: Medium      Past Medical History:   Diagnosis Date     Bundle branch block right 2007     Diffuse cystic mastopathy      Dysplastic nevi      Infectious mononucleosis 1964     Leiomyoma of uterus, unspecified 2004    3cm fibroid per ultrasound     Lump or mass in breast 2004    aspiration large right breast cyst     Periodontitis      Seborrheic keratosis      Unexplained endometrial cells on cervical Pap smear 7/2015    postmenopausal     Past Surgical History:   Procedure Laterality Date     BIOPSY OF BREAST, INCISIONAL  01/01/2003    benign left breast mass     BUNIONECTOMY RT/LT  01/01/2007    RT     COLONOSCOPY  01/01/2003    NL, repeat in 10 years     COLONOSCOPY  09/13/2013    NL, repeat 10 years     CRYOCAUTERY OF CERVIX  01/01/1981     DILATION AND CURETTAGE, HYSTEROSCOPY DIAGNOSTIC, COMBINED N/A 10/16/2015    Procedure: COMBINED DILATION AND CURETTAGE, HYSTEROSCOPY DIAGNOSTIC;  Surgeon: Zahida Salazar MD;  Location: [facility] REPAIR OF HAMMERTOE,ONE  01/01/2007    RT foot second toe     MOHS MICROGRAPHIC PROCEDURE      2019     PUNC/ASPIR BREAST CYST  01/01/2004    benign right breast cyst     Current Outpatient Medications   Medication Sig Dispense Refill     CALCIUM 600 + D 600-400 MG-UNIT OR TABS take BID 0 0      "estradiol (ESTRACE) 0.1 MG/GM vaginal cream PLACE 1 GRAM VAGINALLY TWICE A WEEK 42.5 g 3     MULTIVITAMIN TABS   OR one tab daily  0       Allergies   Allergen Reactions     Tetracyclines         Social History     Tobacco Use     Smoking status: Never Smoker     Smokeless tobacco: Never Used   Substance Use Topics     Alcohol use: No     Alcohol/week: 0.0 standard drinks       History   Drug Use No         Objective     /80   Pulse 80   Temp 97.4  F (36.3  C) (Tympanic)   Resp 16   Ht 1.544 m (5' 0.79\")   Wt 48.1 kg (106 lb)   LMP 05/16/2012   SpO2 98%   BMI 20.17 kg/m      Physical Exam  Constitutional:       General: She is not in acute distress.     Appearance: She is well-developed. She is not diaphoretic.   HENT:      Head: Normocephalic.      Right Ear: External ear normal.      Left Ear: External ear normal.      Nose: Nose normal.   Eyes:      Conjunctiva/sclera: Conjunctivae normal.   Cardiovascular:      Rate and Rhythm: Normal rate and regular rhythm.      Heart sounds: Normal heart sounds.   Pulmonary:      Effort: Pulmonary effort is normal.      Breath sounds: Normal breath sounds.   Musculoskeletal:      Cervical back: Normal range of motion.   Skin:     General: Skin is warm and dry.   Neurological:      Mental Status: She is alert and oriented to person, place, and time.   Psychiatric:         Judgment: Judgment normal.           Diagnostics:  Labs pending at this time.  Results will be reviewed when available.   EKG required for age >65 and not completed in the last 90 days.         EKG Interpretation:      Interpreted by Naina Malhotra PA-C  RBBB, No acute ST segment changes    Clinical Impression: no acute changes and right bundle branch block        Revised Cardiac Risk Index (RCRI):  The patient has the following serious cardiovascular risks for perioperative complications:   - No serious cardiac risks = 0 points     RCRI Interpretation: 0 points: Class I (very low " risk - 0.4% complication rate)           Signed Electronically by: Naina Malhotra PA-C  Copy of this evaluation report is provided to requesting physician.

## 2022-04-05 ASSESSMENT — ENCOUNTER SYMPTOMS
HEARTBURN: 0
NAUSEA: 0
HEADACHES: 0
HEMATOCHEZIA: 0
CHILLS: 0
FREQUENCY: 0
COUGH: 0
PARESTHESIAS: 0
DIZZINESS: 0
SORE THROAT: 0
BREAST MASS: 0
FEVER: 0
PALPITATIONS: 0
EYE PAIN: 0
ABDOMINAL PAIN: 0
WEAKNESS: 0
SHORTNESS OF BREATH: 0
MYALGIAS: 0
DIARRHEA: 0
HEMATURIA: 0
CONSTIPATION: 1
JOINT SWELLING: 0
ARTHRALGIAS: 0
NERVOUS/ANXIOUS: 1
DYSURIA: 0

## 2022-04-05 ASSESSMENT — ACTIVITIES OF DAILY LIVING (ADL): CURRENT_FUNCTION: NO ASSISTANCE NEEDED

## 2022-04-08 ENCOUNTER — OFFICE VISIT (OUTPATIENT)
Dept: FAMILY MEDICINE | Facility: CLINIC | Age: 72
End: 2022-04-08
Payer: COMMERCIAL

## 2022-04-08 VITALS
DIASTOLIC BLOOD PRESSURE: 80 MMHG | OXYGEN SATURATION: 100 % | HEART RATE: 73 BPM | TEMPERATURE: 97.3 F | SYSTOLIC BLOOD PRESSURE: 132 MMHG | HEIGHT: 61 IN | BODY MASS INDEX: 20.09 KG/M2 | WEIGHT: 106.4 LBS | RESPIRATION RATE: 14 BRPM

## 2022-04-08 DIAGNOSIS — Z12.11 SPECIAL SCREENING FOR MALIGNANT NEOPLASMS, COLON: ICD-10-CM

## 2022-04-08 DIAGNOSIS — I10 HYPERTENSION, UNSPECIFIED TYPE: ICD-10-CM

## 2022-04-08 DIAGNOSIS — Z00.00 MEDICARE ANNUAL WELLNESS VISIT, SUBSEQUENT: Primary | ICD-10-CM

## 2022-04-08 DIAGNOSIS — Z13.6 CARDIOVASCULAR SCREENING; LDL GOAL LESS THAN 160: ICD-10-CM

## 2022-04-08 PROCEDURE — 99214 OFFICE O/P EST MOD 30 MIN: CPT | Mod: 25 | Performed by: PHYSICIAN ASSISTANT

## 2022-04-08 PROCEDURE — 99397 PER PM REEVAL EST PAT 65+ YR: CPT | Performed by: PHYSICIAN ASSISTANT

## 2022-04-08 PROCEDURE — 80048 BASIC METABOLIC PNL TOTAL CA: CPT | Performed by: PHYSICIAN ASSISTANT

## 2022-04-08 PROCEDURE — 80061 LIPID PANEL: CPT | Performed by: PHYSICIAN ASSISTANT

## 2022-04-08 PROCEDURE — 36415 COLL VENOUS BLD VENIPUNCTURE: CPT | Performed by: PHYSICIAN ASSISTANT

## 2022-04-08 RX ORDER — LISINOPRIL 10 MG/1
10 TABLET ORAL DAILY
Qty: 90 TABLET | Refills: 1 | Status: SHIPPED | OUTPATIENT
Start: 2022-04-08 | End: 2022-10-03

## 2022-04-08 ASSESSMENT — ENCOUNTER SYMPTOMS
DIZZINESS: 0
MYALGIAS: 0
PALPITATIONS: 0
HEMATURIA: 0
JOINT SWELLING: 0
FREQUENCY: 0
CHILLS: 0
DIARRHEA: 0
BREAST MASS: 0
CONSTIPATION: 1
ABDOMINAL PAIN: 0
WEAKNESS: 0
HEADACHES: 0
EYE PAIN: 0
NAUSEA: 0
HEARTBURN: 0
ARTHRALGIAS: 0
SORE THROAT: 0
DYSURIA: 0
NERVOUS/ANXIOUS: 1
PARESTHESIAS: 0
FEVER: 0
COUGH: 0
HEMATOCHEZIA: 0
SHORTNESS OF BREATH: 0

## 2022-04-08 ASSESSMENT — PAIN SCALES - GENERAL: PAINLEVEL: NO PAIN (0)

## 2022-04-08 ASSESSMENT — ACTIVITIES OF DAILY LIVING (ADL): CURRENT_FUNCTION: NO ASSISTANCE NEEDED

## 2022-04-08 NOTE — PROGRESS NOTES
"SUBJECTIVE:   Rosa M Solano is a 71 year old female who presents for Preventive Visit.      Patient has been advised of split billing requirements and indicates understanding: Yes  Are you in the first 12 months of your Medicare coverage?  No    Healthy Habits:     In general, how would you rate your overall health?  Good    Frequency of exercise:  2-3 days/week    Duration of exercise:  30-45 minutes    Do you usually eat at least 4 servings of fruit and vegetables a day, include whole grains    & fiber and avoid regularly eating high fat or \"junk\" foods?  Yes    Taking medications regularly:  Yes    Medication side effects:  None    Ability to successfully perform activities of daily living:  No assistance needed    Home Safety:  Lack of grab bars in the bathroom    Hearing Impairment:  Difficulty understanding soft or whispered speech    In the past 6 months, have you been bothered by leaking of urine?  No    In general, how would you rate your overall mental or emotional health?  Good      PHQ-2 Total Score: 0    Additional concerns today:  Yes      Noticing high blood pressure at home.  140s/90s consistently.  Has never tried medication in the past      Do you feel safe in your environment? Yes    Have you ever done Advance Care Planning? (For example, a Health Directive, POLST, or a discussion with a medical provider or your loved ones about your wishes): Yes, patient states has an Advance Care Planning document and will bring a copy to the clinic.       Fall risk  Fall Risk Assessment not completed.  click delete button to remove this line now  Cognitive Screening   1) Repeat 3 items (Leader, Season, Table)    2) Clock draw: NORMAL  3) 3 item recall: Recalls 3 objects  Results: 3 items recalled: COGNITIVE IMPAIRMENT LESS LIKELY    Mini-CogTM Copyright WILL Murphy. Licensed by the author for use in [employer]; reprinted with permission (paul@.[facility]). All rights reserved.      Do you have sleep " apnea, excessive snoring or daytime drowsiness?: yes    Reviewed and updated as needed this visit by clinical staff   Tobacco   Meds  Problems  Med Hx  Surg Hx  Fam Hx          Reviewed and updated as needed this visit by Provider   Tobacco    Problems  Med Hx  Surg Hx  Fam Hx         Social History     Tobacco Use     Smoking status: Never Smoker     Smokeless tobacco: Never Used   Substance Use Topics     Alcohol use: No     Alcohol/week: 0.0 standard drinks         Alcohol Use 4/5/2022   Prescreen: >3 drinks/day or >7 drinks/week? Not Applicable   Prescreen: >3 drinks/day or >7 drinks/week? -             Current providers sharing in care for this patient include:  Patient Care Team:  Jeovany Kinney PA-C as PCP - General (Physician Assistant)  Jeovany Kinney PA-C as Assigned PCP    The following health maintenance items are reviewed in Epic and correct as of today:  There are no preventive care reminders to display for this patient.  Patient Active Problem List   Diagnosis     CARDIOVASCULAR SCREENING; LDL GOAL LESS THAN 160     Advanced directives, counseling/discussion     Seborrheic keratosis     Dysplastic nevi     Right bundle branch block     Mild cognitive impairment     Past Surgical History:   Procedure Laterality Date     BIOPSY OF BREAST, INCISIONAL  01/01/2003    benign left breast mass     BUNIONECTOMY RT/LT  01/01/2007    RT     COLONOSCOPY  01/01/2003    NL, repeat in 10 years     COLONOSCOPY  09/13/2013    NL, repeat 10 years     CRYOCAUTERY OF CERVIX  01/01/1981     DILATION AND CURETTAGE, HYSTEROSCOPY DIAGNOSTIC, COMBINED N/A 10/16/2015    Procedure: COMBINED DILATION AND CURETTAGE, HYSTEROSCOPY DIAGNOSTIC;  Surgeon: Zahida Salazar MD;  Location: [facility] REPAIR OF HAMMERTOE,ONE  01/01/2007    RT foot second toe     MOHS MICROGRAPHIC PROCEDURE      2019     PUNC/ASPIR BREAST CYST  01/01/2004    benign right breast cyst       Social History     Tobacco Use      Smoking status: Never Smoker     Smokeless tobacco: Never Used   Substance Use Topics     Alcohol use: No     Alcohol/week: 0.0 standard drinks     Family History   Problem Relation Age of Onset     Alzheimer Disease Mother      Cancer - colorectal Father         at age 60     Bladder Cancer Father         90s     Circulatory Maternal Grandmother          of hepatitis at age 65     Heart Disease Maternal Grandfather         MI age 50         Current Outpatient Medications   Medication Sig Dispense Refill     CALCIUM 600 + D 600-400 MG-UNIT OR TABS take BID 0 0     estradiol (ESTRACE) 0.1 MG/GM vaginal cream PLACE 1 GRAM VAGINALLY TWICE A WEEK 42.5 g 3     lisinopril (ZESTRIL) 10 MG tablet Take 1 tablet (10 mg) by mouth daily 90 tablet 1     MULTIVITAMIN TABS   OR one tab daily  0     Allergies   Allergen Reactions     Tetracyclines      Mammogram Screening: Mammogram Screening: Recommended mammography every 1-2 years with patient discussion and risk factor consideration    S-7:   Breast CA Risk Assessment (FHS-7) 2021   Did any of your first-degree relatives have breast or ovarian cancer? No No   Did any of your relatives have bilateral breast cancer? No No   Did any man in your family have breast cancer? No No   Did any woman in your family have breast and ovarian cancer? No No   Did any woman in your family have breast cancer before age 50 y? No No   Do you have 2 or more relatives with breast and/or ovarian cancer? No No   Do you have 2 or more relatives with breast and/or bowel cancer? No No       Mammogram Screening: Recommended mammography every 1-2 years with patient discussion and risk factor consideration  Pertinent mammograms are reviewed under the imaging tab.    Review of Systems   Constitutional: Negative for chills and fever.   HENT: Negative for congestion, ear pain, hearing loss and sore throat.    Eyes: Negative for pain and visual disturbance.   Respiratory: Negative for  "cough and shortness of breath.    Cardiovascular: Negative for chest pain, palpitations and peripheral edema.   Gastrointestinal: Positive for constipation. Negative for abdominal pain, diarrhea, heartburn, hematochezia and nausea.   Breasts:  Negative for tenderness, breast mass and discharge.   Genitourinary: Negative for dysuria, frequency, genital sores, hematuria, pelvic pain, urgency, vaginal bleeding and vaginal discharge.   Musculoskeletal: Negative for arthralgias, joint swelling and myalgias.   Skin: Negative for rash.   Neurological: Negative for dizziness, weakness, headaches and paresthesias.   Psychiatric/Behavioral: Negative for mood changes. The patient is nervous/anxious.        OBJECTIVE:   /80 (BP Location: Left arm, Patient Position: Sitting, Cuff Size: Adult Regular)   Pulse 73   Temp 97.3  F (36.3  C) (Tympanic)   Resp 14   Ht 1.55 m (5' 1.02\")   Wt 48.3 kg (106 lb 6.4 oz)   LMP 05/16/2012   SpO2 100%   BMI 20.09 kg/m   Estimated body mass index is 20.09 kg/m  as calculated from the following:    Height as of this encounter: 1.55 m (5' 1.02\").    Weight as of this encounter: 48.3 kg (106 lb 6.4 oz).  Physical Exam  GENERAL: healthy, alert and no distress  EYES: Eyes grossly normal to inspection, PERRL and conjunctivae and sclerae normal  HENT: ear canals and TM's normal, nose and mouth without ulcers or lesions  NECK: no adenopathy, no asymmetry, masses, or scars and thyroid normal to palpation  RESP: lungs clear to auscultation - no rales, rhonchi or wheezes  CV: regular rate and rhythm, normal S1 S2, no S3 or S4, no murmur, click or rub, no peripheral edema and peripheral pulses strong  ABDOMEN: soft, nontender, no hepatosplenomegaly, no masses and bowel sounds normal  MS: no gross musculoskeletal defects noted, no edema  SKIN: no suspicious lesions or rashes  NEURO: Normal strength and tone, mentation intact and speech normal  PSYCH: mentation appears normal, affect " "normal/bright    Diagnostic Test Results:  none     ASSESSMENT / PLAN:     1. Medicare annual wellness visit, subsequent    2. Hypertension, unspecified type  Elevated BPs at home (140s/90s consistently).  She would benefit from starting Lisinopril.  !0 mg sent to pharmacy.  USes and se discussed and understood by patient.  She will follow up via [facility] in 1 month with readings  - lisinopril (ZESTRIL) 10 MG tablet; Take 1 tablet (10 mg) by mouth daily  Dispense: 90 tablet; Refill: 1  - Basic metabolic panel  (Ca, Cl, CO2, Creat, Gluc, K, Na, BUN); Future  - Basic metabolic panel  (Ca, Cl, CO2, Creat, Gluc, K, Na, BUN)    3. CARDIOVASCULAR SCREENING; LDL GOAL LESS THAN 160  - Lipid Profile (Chol, Trig, HDL, LDL calc); Future  - Lipid Profile (Chol, Trig, HDL, LDL calc)    4. Special screening for malignant neoplasms, colon  - Fecal colorectal cancer screen (FIT); Future  - Fecal colorectal cancer screen (FIT)          COUNSELING:  Reviewed preventive health counseling, as reflected in patient instructions       Regular exercise       Healthy diet/nutrition       Immunizations    UTD        Estimated body mass index is 20.09 kg/m  as calculated from the following:    Height as of this encounter: 1.55 m (5' 1.02\").    Weight as of this encounter: 48.3 kg (106 lb 6.4 oz).        She reports that she has never smoked. She has never used smokeless tobacco.      Appropriate preventive services were discussed with this patient, including applicable screening as appropriate for cardiovascular disease, diabetes, osteopenia/osteoporosis, and glaucoma.  As appropriate for age/gender, discussed screening for colorectal cancer, prostate cancer, breast cancer, and cervical cancer. Checklist reviewing preventive services available has been given to the patient.    Reviewed patients plan of care and provided an AVS. The Basic Care Plan (routine screening as documented in Health Maintenance) for Rosa M meets the Care Plan " requirement. This Care Plan has been established and reviewed with the Patient.    Counseling Resources:  ATP IV Guidelines  Pooled Cohorts Equation Calculator  Breast Cancer Risk Calculator  Breast Cancer: Medication to Reduce Risk  FRAX Risk Assessment  ICSI Preventive Guidelines  Dietary Guidelines for Americans, 2010  USDA's MyPlate  ASA Prophylaxis  Lung CA Screening    [facility]    Identified Health Risks:

## 2022-04-09 LAB
ANION GAP SERPL CALCULATED.3IONS-SCNC: 5 MMOL/L (ref 3–14)
BUN SERPL-MCNC: 17 MG/DL (ref 7–30)
CALCIUM SERPL-MCNC: 9.8 MG/DL (ref 8.5–10.1)
CHLORIDE BLD-SCNC: 105 MMOL/L (ref 94–109)
CHOLEST SERPL-MCNC: 209 MG/DL
CO2 SERPL-SCNC: 27 MMOL/L (ref 20–32)
CREAT SERPL-MCNC: 0.67 MG/DL (ref 0.52–1.04)
FASTING STATUS PATIENT QL REPORTED: YES
GFR SERPL CREATININE-BSD FRML MDRD: >90 ML/MIN/1.73M2
GLUCOSE BLD-MCNC: 87 MG/DL (ref 70–99)
HDLC SERPL-MCNC: 79 MG/DL
LDLC SERPL CALC-MCNC: 116 MG/DL
NONHDLC SERPL-MCNC: 130 MG/DL
POTASSIUM BLD-SCNC: 3.9 MMOL/L (ref 3.4–5.3)
SODIUM SERPL-SCNC: 137 MMOL/L (ref 133–144)
TRIGL SERPL-MCNC: 70 MG/DL

## 2022-04-09 PROCEDURE — 82274 ASSAY TEST FOR BLOOD FECAL: CPT | Performed by: PHYSICIAN ASSISTANT

## 2022-04-11 NOTE — RESULT ENCOUNTER NOTE
Rosa M-  Here are your recent results.    Your labs look normal with the exception of mildly elevated LDL cholesterol.  This is stable from your results last year.   Continue to eat a healthy diet, get regular exercise and we can recheck you labs in 1 year.    Please let me know if you have questions  Jeovany Kinney PA-C

## 2022-04-12 LAB — HEMOCCULT STL QL IA: NEGATIVE

## 2022-04-12 NOTE — RESULT ENCOUNTER NOTE
Rosa M-  Here are your recent results.    Your colon cancer screening is negative at this time    Jeovany Kinney PA-C

## 2022-09-30 ENCOUNTER — MYC MEDICAL ADVICE (OUTPATIENT)
Dept: FAMILY MEDICINE | Facility: CLINIC | Age: 72
End: 2022-09-30

## 2022-09-30 DIAGNOSIS — I10 HYPERTENSION, UNSPECIFIED TYPE: ICD-10-CM

## 2022-10-03 RX ORDER — LISINOPRIL 10 MG/1
10 TABLET ORAL DAILY
Qty: 90 TABLET | Refills: 1 | Status: SHIPPED | OUTPATIENT
Start: 2022-10-03 | End: 2023-03-09

## 2022-10-03 NOTE — TELEPHONE ENCOUNTER
BP Readings from Last 3 Encounters:   04/08/22 132/80   12/30/21 130/80   07/30/21 110/74     Creatinine   Date Value Ref Range Status   04/08/2022 0.67 0.52 - 1.04 mg/dL Final   09/01/2020 0.70 0.52 - 1.04 mg/dL Final     See patient message:  My blood pressure is still in the 130-138 / 73-80. No side effects from the Lisinopril. I will need a refill in about a week at [facility] in [place]. If I don't hear from you or [facility], I will call them next week for a 90 day refill & they will contact you.    Message sent to  advise patient to schedule an appointment with PCP for further refills.    Refilled per [facility] protocol.  Richa Kee RN

## 2022-12-03 ENCOUNTER — HEALTH MAINTENANCE LETTER (OUTPATIENT)
Age: 72
End: 2022-12-03

## 2022-12-07 ENCOUNTER — ANCILLARY PROCEDURE (OUTPATIENT)
Dept: MAMMOGRAPHY | Facility: CLINIC | Age: 72
End: 2022-12-07
Attending: PHYSICIAN ASSISTANT
Payer: COMMERCIAL

## 2022-12-07 DIAGNOSIS — Z12.31 VISIT FOR SCREENING MAMMOGRAM: ICD-10-CM

## 2022-12-07 PROCEDURE — 77067 SCR MAMMO BI INCL CAD: CPT | Mod: TC | Performed by: RADIOLOGY

## 2022-12-07 PROCEDURE — 77063 BREAST TOMOSYNTHESIS BI: CPT | Mod: TC | Performed by: RADIOLOGY

## 2022-12-09 DIAGNOSIS — N95.2 POST-MENOPAUSAL ATROPHIC VAGINITIS: ICD-10-CM

## 2022-12-09 RX ORDER — ESTRADIOL 0.1 MG/G
CREAM VAGINAL
Qty: 42.5 G | Refills: 0 | Status: SHIPPED | OUTPATIENT
Start: 2022-12-09 | End: 2023-03-13

## 2022-12-09 NOTE — TELEPHONE ENCOUNTER
Prescription approved per [facility] Refill Protocol.  Daren Stack RN  [facility] Triage Nurse

## 2023-03-10 DIAGNOSIS — N95.2 POST-MENOPAUSAL ATROPHIC VAGINITIS: ICD-10-CM

## 2023-03-13 RX ORDER — ESTRADIOL 0.1 MG/G
CREAM VAGINAL
Qty: 42.5 G | Refills: 0 | Status: SHIPPED | OUTPATIENT
Start: 2023-03-13 | End: 2023-06-29

## 2023-04-17 ASSESSMENT — ENCOUNTER SYMPTOMS
NERVOUS/ANXIOUS: 1
HEADACHES: 0
MYALGIAS: 0
JOINT SWELLING: 0
SORE THROAT: 0
FEVER: 0
DYSURIA: 0
DIARRHEA: 0
CHILLS: 0
PARESTHESIAS: 0
FREQUENCY: 0
CONSTIPATION: 1
BREAST MASS: 0
HEMATOCHEZIA: 0
SHORTNESS OF BREATH: 0
HEARTBURN: 0
ARTHRALGIAS: 0
EYE PAIN: 0
ABDOMINAL PAIN: 0
NAUSEA: 0
HEMATURIA: 0
PALPITATIONS: 0
DIZZINESS: 0
COUGH: 1
WEAKNESS: 1

## 2023-04-17 ASSESSMENT — ACTIVITIES OF DAILY LIVING (ADL): CURRENT_FUNCTION: NO ASSISTANCE NEEDED

## 2023-04-23 PROCEDURE — 82274 ASSAY TEST FOR BLOOD FECAL: CPT | Performed by: PHYSICIAN ASSISTANT

## 2023-04-24 ENCOUNTER — OFFICE VISIT (OUTPATIENT)
Dept: FAMILY MEDICINE | Facility: CLINIC | Age: 73
End: 2023-04-24
Payer: COMMERCIAL

## 2023-04-24 VITALS
HEART RATE: 70 BPM | WEIGHT: 101 LBS | SYSTOLIC BLOOD PRESSURE: 131 MMHG | BODY MASS INDEX: 19.83 KG/M2 | TEMPERATURE: 97.9 F | DIASTOLIC BLOOD PRESSURE: 81 MMHG | HEIGHT: 60 IN | RESPIRATION RATE: 15 BRPM | OXYGEN SATURATION: 99 %

## 2023-04-24 DIAGNOSIS — Z23 HIGH PRIORITY FOR 2019-NCOV VACCINE: ICD-10-CM

## 2023-04-24 DIAGNOSIS — Z13.6 CARDIOVASCULAR SCREENING; LDL GOAL LESS THAN 100: ICD-10-CM

## 2023-04-24 DIAGNOSIS — Z00.00 MEDICARE ANNUAL WELLNESS VISIT, SUBSEQUENT: Primary | ICD-10-CM

## 2023-04-24 DIAGNOSIS — I10 HYPERTENSION, UNSPECIFIED TYPE: ICD-10-CM

## 2023-04-24 DIAGNOSIS — Z00.00 ENCOUNTER FOR MEDICARE ANNUAL WELLNESS EXAM: ICD-10-CM

## 2023-04-24 DIAGNOSIS — Z13.0 SCREENING FOR DEFICIENCY ANEMIA: ICD-10-CM

## 2023-04-24 DIAGNOSIS — Z12.11 SPECIAL SCREENING FOR MALIGNANT NEOPLASMS, COLON: ICD-10-CM

## 2023-04-24 DIAGNOSIS — R25.2 LEG CRAMPS: ICD-10-CM

## 2023-04-24 LAB
ALBUMIN SERPL BCG-MCNC: 4.4 G/DL (ref 3.5–5.2)
ALP SERPL-CCNC: 79 U/L (ref 35–104)
ALT SERPL W P-5'-P-CCNC: 9 U/L (ref 10–35)
ANION GAP SERPL CALCULATED.3IONS-SCNC: 13 MMOL/L (ref 7–15)
AST SERPL W P-5'-P-CCNC: ABNORMAL U/L
BILIRUB SERPL-MCNC: 0.7 MG/DL
BUN SERPL-MCNC: 21.3 MG/DL (ref 8–23)
CALCIUM SERPL-MCNC: 10.1 MG/DL (ref 8.8–10.2)
CHLORIDE SERPL-SCNC: 103 MMOL/L (ref 98–107)
CHOLEST SERPL-MCNC: 213 MG/DL
CREAT SERPL-MCNC: 0.71 MG/DL (ref 0.51–0.95)
DEPRECATED HCO3 PLAS-SCNC: 25 MMOL/L (ref 22–29)
ERYTHROCYTE [DISTWIDTH] IN BLOOD BY AUTOMATED COUNT: 12.7 % (ref 10–15)
GFR SERPL CREATININE-BSD FRML MDRD: >90 ML/MIN/1.73M2
GLUCOSE SERPL-MCNC: 85 MG/DL (ref 70–99)
HCT VFR BLD AUTO: 42.3 % (ref 35–47)
HDLC SERPL-MCNC: 82 MG/DL
HGB BLD-MCNC: 14.3 G/DL (ref 11.7–15.7)
LDLC SERPL CALC-MCNC: 117 MG/DL
MCH RBC QN AUTO: 32.7 PG (ref 26.5–33)
MCHC RBC AUTO-ENTMCNC: 33.8 G/DL (ref 31.5–36.5)
MCV RBC AUTO: 97 FL (ref 78–100)
NONHDLC SERPL-MCNC: 131 MG/DL
PLATELET # BLD AUTO: 282 10E3/UL (ref 150–450)
POTASSIUM SERPL-SCNC: 4.7 MMOL/L (ref 3.4–5.3)
PROT SERPL-MCNC: 7.5 G/DL (ref 6.4–8.3)
RBC # BLD AUTO: 4.37 10E6/UL (ref 3.8–5.2)
SODIUM SERPL-SCNC: 141 MMOL/L (ref 136–145)
TRIGL SERPL-MCNC: 71 MG/DL
WBC # BLD AUTO: 6.1 10E3/UL (ref 4–11)

## 2023-04-24 PROCEDURE — 80061 LIPID PANEL: CPT | Performed by: PHYSICIAN ASSISTANT

## 2023-04-24 PROCEDURE — 91312 COVID-19 VACCINE BIVALENT BOOSTER 12+ (PFIZER): CPT | Performed by: PHYSICIAN ASSISTANT

## 2023-04-24 PROCEDURE — 36415 COLL VENOUS BLD VENIPUNCTURE: CPT | Performed by: PHYSICIAN ASSISTANT

## 2023-04-24 PROCEDURE — G0439 PPPS, SUBSEQ VISIT: HCPCS | Performed by: PHYSICIAN ASSISTANT

## 2023-04-24 PROCEDURE — 80048 BASIC METABOLIC PNL TOTAL CA: CPT | Performed by: PHYSICIAN ASSISTANT

## 2023-04-24 PROCEDURE — 84075 ASSAY ALKALINE PHOSPHATASE: CPT | Performed by: PHYSICIAN ASSISTANT

## 2023-04-24 PROCEDURE — 84155 ASSAY OF PROTEIN SERUM: CPT | Performed by: PHYSICIAN ASSISTANT

## 2023-04-24 PROCEDURE — 0124A COVID-19 VACCINE BIVALENT BOOSTER 12+ (PFIZER): CPT | Performed by: PHYSICIAN ASSISTANT

## 2023-04-24 PROCEDURE — 99213 OFFICE O/P EST LOW 20 MIN: CPT | Mod: 25 | Performed by: PHYSICIAN ASSISTANT

## 2023-04-24 PROCEDURE — 82247 BILIRUBIN TOTAL: CPT | Performed by: PHYSICIAN ASSISTANT

## 2023-04-24 PROCEDURE — 85027 COMPLETE CBC AUTOMATED: CPT | Performed by: PHYSICIAN ASSISTANT

## 2023-04-24 PROCEDURE — 84460 ALANINE AMINO (ALT) (SGPT): CPT | Performed by: PHYSICIAN ASSISTANT

## 2023-04-24 PROCEDURE — 82040 ASSAY OF SERUM ALBUMIN: CPT | Performed by: PHYSICIAN ASSISTANT

## 2023-04-24 RX ORDER — LISINOPRIL 20 MG/1
20 TABLET ORAL DAILY
Qty: 90 TABLET | Refills: 1 | COMMUNITY
Start: 2023-04-24 | End: 2023-07-03

## 2023-04-24 ASSESSMENT — ENCOUNTER SYMPTOMS
MYALGIAS: 0
FEVER: 0
ABDOMINAL PAIN: 0
HEADACHES: 0
HEMATOCHEZIA: 0
NAUSEA: 0
HEARTBURN: 0
SHORTNESS OF BREATH: 0
DIARRHEA: 0
HEMATURIA: 0
DIZZINESS: 0
BREAST MASS: 0
COUGH: 1
FREQUENCY: 0
WEAKNESS: 1
NERVOUS/ANXIOUS: 1
CHILLS: 0
EYE PAIN: 0
PARESTHESIAS: 0
DYSURIA: 0
SORE THROAT: 0
PALPITATIONS: 0
JOINT SWELLING: 0
CONSTIPATION: 1
ARTHRALGIAS: 0

## 2023-04-24 ASSESSMENT — PAIN SCALES - GENERAL: PAINLEVEL: NO PAIN (0)

## 2023-04-24 ASSESSMENT — ACTIVITIES OF DAILY LIVING (ADL): CURRENT_FUNCTION: NO ASSISTANCE NEEDED

## 2023-04-24 NOTE — PATIENT INSTRUCTIONS
Patient Education   Personalized Prevention Plan  You are due for the preventive services outlined below.  Your care team is available to assist you in scheduling these services.  If you have already completed any of these items, please share that information with your care team to update in your medical record.  Health Maintenance Due   Topic Date Due     Annual Wellness Visit  08/05/2020     Your Health Risk Assessment indicates you feel you are not in good emotional health.    Recreation   Recreation is not limited to sports and team events. It includes any activity that provides relaxation, interest, enjoyment, and exercise. Recreation provides an outlet for physical, mental, and social energy. It can give a sense of worth and achievement. It can help you stay healthy.    Mental Exercise and Social Involvement  Mental and emotional health is as important as physical health. Keep in touch with friends and family. Stay as active as possible. Continue to learn and challenge yourself.   Things you can do to stay mentally active are:    Learn something new, like a foreign language or musical instrument.     Play SCRABBLE or do crossword puzzles. If you cannot find people to play these games with you at home, you can play them with others on your computer through the Internet.     Join a games club--anything from card games to chess or checkers or lawn bowling.     Start a new hobby.     Go back to school.     Volunteer.     Read.   Keep up with world events.

## 2023-04-24 NOTE — PROGRESS NOTES
"SUBJECTIVE:   Felipe is a 72 year old who presents for Preventive Visit.      4/24/2023    10:52 AM   Additional Questions   Roomed by Nohemy Velazquez   Patient has been advised of split billing requirements and indicates understanding: Yes  Are you in the first 12 months of your Medicare coverage?  No    Healthy Habits:     In general, how would you rate your overall health?  Good    Frequency of exercise:  6-7 days/week    Duration of exercise:  45-60 minutes    Do you usually eat at least 4 servings of fruit and vegetables a day, include whole grains    & fiber and avoid regularly eating high fat or \"junk\" foods?  Yes    Taking medications regularly:  Yes    Medication side effects:  None    Ability to successfully perform activities of daily living:  No assistance needed    Home Safety:  Lack of grab bars in the bathroom    Hearing Impairment:  No hearing concerns    In the past 6 months, have you been bothered by leaking of urine?  No    In general, how would you rate your overall mental or emotional health?  Fair      PHQ-2 Total Score: 0    Additional concerns today:  No      Have you ever done Advance Care Planning? (For example, a Health Directive, POLST, or a discussion with a medical provider or your loved ones about your wishes): Yes, advance care planning is on file.    Fall risk  Fallen 2 or more times in the past year?: No  Any fall with injury in the past year?: No  click delete button to remove this line now  Cognitive Screening   1) Repeat 3 items (Leader, Season, Table)    2) Clock draw: NORMAL  3) 3 item recall: Recalls 3 objects  Results: 3 items recalled: COGNITIVE IMPAIRMENT LESS LIKELY    Mini-CogTM Copyright WILL Murphy. Licensed by the author for use in [facility]; reprinted with permission (paul@.[email or web address]). All rights reserved.          Reviewed and updated as needed this visit by clinical staff   Tobacco  Allergies   Problems  Med Hx  Surg Hx  Fam Hx          Reviewed and " updated as needed this visit by Provider   Tobacco    Problems  Med Hx  Surg Hx  Fam Hx         Social History     Tobacco Use     Smoking status: Never     Smokeless tobacco: Never   Vaping Use     Vaping status: Never Used   Substance Use Topics     Alcohol use: No     Alcohol/week: 0.0 standard drinks of alcohol           4/17/2023     9:27 AM   Alcohol Use   Prescreen: >3 drinks/day or >7 drinks/week? Not Applicable     Do you have a current opioid prescription? No  Do you use any other controlled substances or medications that are not prescribed by a provider? None      Current providers sharing in care for this patient include:  Patient Care Team:  Jeovany Kinney PA-C as PCP - General (Physician Assistant)  Jeovany Kinney PA-C as Assigned PCP    The following health maintenance items are reviewed in Epic and correct as of today:  Health Maintenance   Topic Date Due     MEDICARE ANNUAL WELLNESS VISIT  08/05/2020     DTAP/TDAP/TD IMMUNIZATION (2 - Td or Tdap) 07/01/2023     COLORECTAL CANCER SCREENING  09/13/2023     ANNUAL REVIEW OF HM ORDERS  04/24/2024     FALL RISK ASSESSMENT  04/24/2024     MAMMO SCREENING  12/07/2024     LIPID  04/08/2027     ADVANCE CARE PLANNING  04/24/2028     DEXA  09/12/2029     HEPATITIS C SCREENING  Completed     PHQ-2 (once per calendar year)  Completed     INFLUENZA VACCINE  Completed     Pneumococcal Vaccine: 65+ Years  Completed     ZOSTER IMMUNIZATION  Completed     COVID-19 Vaccine  Completed     IPV IMMUNIZATION  Aged Out     MENINGITIS IMMUNIZATION  Aged Out     Patient Active Problem List   Diagnosis     CARDIOVASCULAR SCREENING; LDL GOAL LESS THAN 160     Advanced directives, counseling/discussion     Seborrheic keratosis     Dysplastic nevi     Right bundle branch block     Mild cognitive impairment     Past Surgical History:   Procedure Laterality Date     BIOPSY OF BREAST, INCISIONAL  01/01/2003    benign left breast mass     BUNIONECTOMY RT/LT   2007    RT     COLONOSCOPY  2003    NL, repeat in 10 years     COLONOSCOPY  2013    NL, repeat 10 years     CRYOCAUTERY OF CERVIX  1981     DILATION AND CURETTAGE, HYSTEROSCOPY DIAGNOSTIC, COMBINED N/A 10/16/2015    Procedure: COMBINED DILATION AND CURETTAGE, HYSTEROSCOPY DIAGNOSTIC;  Surgeon: Zahida Salazar MD;  Location: [facility] REPAIR OF HAMMERTOE,ONE  2007    RT foot second toe     MOHS MICROGRAPHIC PROCEDURE      2019     PUNC/ASPIR BREAST CYST  2004    benign right breast cyst       Social History     Tobacco Use     Smoking status: Never     Smokeless tobacco: Never   Vaping Use     Vaping status: Never Used   Substance Use Topics     Alcohol use: No     Alcohol/week: 0.0 standard drinks of alcohol     Family History   Problem Relation Age of Onset     Alzheimer Disease Mother      Cancer - colorectal Father         at age 60     Bladder Cancer Father         90s     Circulatory Maternal Grandmother          of hepatitis at age 65     Heart Disease Maternal Grandfather         MI age 50         Current Outpatient Medications   Medication Sig Dispense Refill     lisinopril (ZESTRIL) 20 MG tablet Take 1 tablet (20 mg) by mouth daily 90 tablet 1     CALCIUM 600 + D 600-400 MG-UNIT OR TABS take BID 0 0     estradiol (ESTRACE) 0.1 MG/GM vaginal cream PLACE 1 GRAM VAGINALLY TWICE A WEEK 42.5 g 0     MULTIVITAMIN TABS   OR one tab daily  0     Allergies   Allergen Reactions     Tetracyclines            Review of Systems   Constitutional: Negative for chills and fever.   HENT: Negative for congestion, ear pain, hearing loss and sore throat.    Eyes: Negative for pain and visual disturbance.   Respiratory: Positive for cough. Negative for shortness of breath.    Cardiovascular: Negative for chest pain, palpitations and peripheral edema.   Gastrointestinal: Positive for constipation. Negative for abdominal pain, diarrhea, heartburn, hematochezia and nausea.   Breasts:   "Negative for tenderness, breast mass and discharge.   Genitourinary: Negative for dysuria, frequency, genital sores, hematuria, pelvic pain, urgency, vaginal bleeding and vaginal discharge.   Musculoskeletal: Negative for arthralgias, joint swelling and myalgias.   Skin: Negative for rash.   Neurological: Positive for weakness. Negative for dizziness, headaches and paresthesias.   Psychiatric/Behavioral: Negative for mood changes. The patient is nervous/anxious.          OBJECTIVE:   /81   Pulse 70   Temp 97.9  F (36.6  C) (Temporal)   Resp 15   Ht 1.52 m (4' 11.84\")   Wt 45.8 kg (101 lb)   LMP 05/16/2012   SpO2 99%   BMI 19.83 kg/m   Estimated body mass index is 19.83 kg/m  as calculated from the following:    Height as of this encounter: 1.52 m (4' 11.84\").    Weight as of this encounter: 45.8 kg (101 lb).  Physical Exam  GENERAL: healthy, alert and no distress  EYES: Eyes grossly normal to inspection, PERRL and conjunctivae and sclerae normal  HENT: ear canals and TM's normal, nose and mouth without ulcers or lesions  NECK: no adenopathy, no asymmetry, masses, or scars and thyroid normal to palpation  RESP: lungs clear to auscultation - no rales, rhonchi or wheezes  CV: regular rate and rhythm, normal S1 S2, no S3 or S4, no murmur, click or rub, no peripheral edema and peripheral pulses strong  ABDOMEN: soft, nontender, no hepatosplenomegaly, no masses and bowel sounds normal  MS: no gross musculoskeletal defects noted, no edema  SKIN: no suspicious lesions or rashes  NEURO: Normal strength and tone, mentation intact and speech normal  PSYCH: mentation appears normal, affect normal/bright      ASSESSMENT / PLAN:     1. Medicare annual wellness visit, subsequent    2. Hypertension, unspecified type  BPs have been around 140/80s at home.  Advise that she increase to 20 mg daily of lisinopril  - lisinopril (ZESTRIL) 20 MG tablet; Take 1 tablet (20 mg) by mouth daily  Dispense: 90 tablet; Refill: " 1    3. Leg cramps  Ongoing, worse in the morning or after laying for longer periods. Recommend frequent strecthing after exercise, labs today to assess electrolytes and recommend increase in water consumption.    4. Special screening for malignant neoplasms, colon  - Fecal colorectal cancer screen (FIT); Future  - Fecal colorectal cancer screen (FIT)    5. CARDIOVASCULAR SCREENING; LDL GOAL LESS THAN 100  - REVIEW OF HEALTH MAINTENANCE PROTOCOL ORDERS  - Comprehensive metabolic panel (BMP + Alb, Alk Phos, ALT, AST, Total. Bili, TP); Future  - Lipid Profile (Chol, Trig, HDL, LDL calc); Future  - Comprehensive metabolic panel (BMP + Alb, Alk Phos, ALT, AST, Total. Bili, TP)  - Lipid Profile (Chol, Trig, HDL, LDL calc)    6. Screening for deficiency anemia  - CBC with platelets; Future  - CBC with platelets    7. High priority for 2019-nCoV vaccine  - COVID-19,PF,PFIZER BOOSTER BIVALENT 12+Yrs            COUNSELING:  Reviewed preventive health counseling, as reflected in patient instructions       Regular exercise       Healthy diet/nutrition        She reports that she has never smoked. She has never used smokeless tobacco.      Appropriate preventive services were discussed with this patient, including applicable screening as appropriate for cardiovascular disease, diabetes, osteopenia/osteoporosis, and glaucoma.  As appropriate for age/gender, discussed screening for colorectal cancer, prostate cancer, breast cancer, and cervical cancer. Checklist reviewing preventive services available has been given to the patient.    Reviewed patients plan of care and provided an AVS. The Basic Care Plan (routine screening as documented in Health Maintenance) for Rosa M meets the Care Plan requirement. This Care Plan has been established and reviewed with the Patient.    Jeovany Kinney PA-C  [facility]    Identified Health Risks:    I have reviewed Opioid Use Disorder and Substance Use  Disorder risk factors and made any needed referrals.       The patient was provided with suggestions to help her develop a healthy emotional lifestyle.

## 2023-04-26 LAB — HEMOCCULT STL QL IA: NEGATIVE

## 2023-04-26 NOTE — RESULT ENCOUNTER NOTE
Rosa M-  Here are your recent results.     Your cholesterol levels are stable at this time.  We can recheck these in 1 year.  Your complete blood count is normal.   I received a message from our lab stating that  there was an error with your complete metabolic panel and so the results are not accurate. You can return for a lab only visit at your convenience to have this rechecked    Jeovany Kinney PA-C

## 2023-04-27 NOTE — RESULT ENCOUNTER NOTE
Rosa M-  Here are your recent results.       -FIT test (screening test for colon cancer) was normal. ADVISE: rechecking this test in 1 year.    If you have any questions please do not hesitate to contact our office via phone ([phone]) or you may send me a message via [employer] by clicking the contact my Care Team link.          Thank you,    Jeovany Kinney MPH, PA-C  [address] [place] [address] [phone]

## 2023-05-01 ENCOUNTER — DOCUMENTATION ONLY (OUTPATIENT)
Dept: LAB | Facility: CLINIC | Age: 73
End: 2023-05-01

## 2023-05-01 DIAGNOSIS — R89.9 ABNORMAL LABORATORY TEST: Primary | ICD-10-CM

## 2023-05-01 NOTE — PROGRESS NOTES
Rosa M Solano has an upcoming lab appointment:    Future Appointments   Date Time Provider Department Center   5/23/2023  7:15 AM [facility] LAB ECL[facility]     Patient is scheduled for the following lab(s): Patient has an upcoming lab appt to re-do Basic Metabolic Panel Lab. Please place orders if necessary. Thank you    There is no order available. Please review and place either future orders or HMPO (Review of Health Maintenance Protocol Orders), as appropriate.    There are no preventive care reminders to display for this patient.  Hilda Dowling

## 2023-05-23 ENCOUNTER — LAB (OUTPATIENT)
Dept: LAB | Facility: CLINIC | Age: 73
End: 2023-05-23
Payer: COMMERCIAL

## 2023-05-23 DIAGNOSIS — R89.9 ABNORMAL LABORATORY TEST: ICD-10-CM

## 2023-05-23 LAB
ANION GAP SERPL CALCULATED.3IONS-SCNC: 13 MMOL/L (ref 7–15)
BUN SERPL-MCNC: 24.3 MG/DL (ref 8–23)
CALCIUM SERPL-MCNC: 9.5 MG/DL (ref 8.8–10.2)
CHLORIDE SERPL-SCNC: 105 MMOL/L (ref 98–107)
CREAT SERPL-MCNC: 0.8 MG/DL (ref 0.51–0.95)
DEPRECATED HCO3 PLAS-SCNC: 24 MMOL/L (ref 22–29)
GFR SERPL CREATININE-BSD FRML MDRD: 79 ML/MIN/1.73M2
GLUCOSE SERPL-MCNC: 88 MG/DL (ref 70–99)
POTASSIUM SERPL-SCNC: 4.2 MMOL/L (ref 3.4–5.3)
SODIUM SERPL-SCNC: 142 MMOL/L (ref 136–145)

## 2023-05-23 PROCEDURE — 80048 BASIC METABOLIC PNL TOTAL CA: CPT

## 2023-05-23 PROCEDURE — 36415 COLL VENOUS BLD VENIPUNCTURE: CPT

## 2023-05-25 NOTE — RESULT ENCOUNTER NOTE
Rosa M-  Here are your recent results.     Your repeated metabolic panel is normal at this time. We can recheck this in 1 year    Jeovany Kinney PA-C

## 2023-05-26 DIAGNOSIS — I10 HYPERTENSION, UNSPECIFIED TYPE: ICD-10-CM

## 2023-05-26 RX ORDER — LISINOPRIL 10 MG/1
10 TABLET ORAL DAILY
Qty: 90 TABLET | Refills: 0 | Status: SHIPPED | OUTPATIENT
Start: 2023-05-26 | End: 2023-07-03

## 2023-06-28 DIAGNOSIS — N95.2 POST-MENOPAUSAL ATROPHIC VAGINITIS: ICD-10-CM

## 2023-06-29 RX ORDER — ESTRADIOL 0.1 MG/G
CREAM VAGINAL
Qty: 42.5 G | Refills: 1 | Status: SHIPPED | OUTPATIENT
Start: 2023-06-29

## 2023-07-02 ENCOUNTER — MYC MEDICAL ADVICE (OUTPATIENT)
Dept: FAMILY MEDICINE | Facility: CLINIC | Age: 73
End: 2023-07-02
Payer: COMMERCIAL

## 2023-07-02 DIAGNOSIS — I10 HYPERTENSION, UNSPECIFIED TYPE: ICD-10-CM

## 2023-07-03 RX ORDER — LISINOPRIL 20 MG/1
20 TABLET ORAL DAILY
Qty: 90 TABLET | Refills: 0 | Status: SHIPPED | OUTPATIENT
Start: 2023-07-03 | End: 2023-10-02

## 2023-07-03 NOTE — TELEPHONE ENCOUNTER
Please see etoucheshart message and advise.   This is historical.    Shawnee BANKS RN  [facility]

## 2023-07-03 NOTE — TELEPHONE ENCOUNTER
Patient notified of provider's response via jobs-dial LLChart.    Allyn JUAN RN  [facility] Triage Team

## 2024-01-07 ENCOUNTER — HEALTH MAINTENANCE LETTER (OUTPATIENT)
Age: 74
End: 2024-01-07

## 2024-03-25 ENCOUNTER — PATIENT OUTREACH (OUTPATIENT)
Dept: CARE COORDINATION | Facility: CLINIC | Age: 74
End: 2024-03-25
Payer: COMMERCIAL

## 2024-05-26 ENCOUNTER — HEALTH MAINTENANCE LETTER (OUTPATIENT)
Age: 74
End: 2024-05-26

## 2024-11-07 ENCOUNTER — PATIENT OUTREACH (OUTPATIENT)
Dept: CARE COORDINATION | Facility: CLINIC | Age: 74
End: 2024-11-07
Payer: COMMERCIAL

## 2025-02-22 ENCOUNTER — HEALTH MAINTENANCE LETTER (OUTPATIENT)
Age: 75
End: 2025-02-22

## 2025-06-14 ENCOUNTER — HEALTH MAINTENANCE LETTER (OUTPATIENT)
Age: 75
End: 2025-06-14